# Patient Record
Sex: MALE | Race: BLACK OR AFRICAN AMERICAN | Employment: OTHER | URBAN - METROPOLITAN AREA
[De-identification: names, ages, dates, MRNs, and addresses within clinical notes are randomized per-mention and may not be internally consistent; named-entity substitution may affect disease eponyms.]

---

## 2018-09-07 LAB — PROSTATE SPECIFIC ANTIGEN: 0.29 NG/ML (ref 0–4)

## 2019-10-28 ENCOUNTER — APPOINTMENT (OUTPATIENT)
Dept: CT IMAGING | Age: 84
DRG: 378 | End: 2019-10-28
Payer: MEDICARE

## 2019-10-28 ENCOUNTER — APPOINTMENT (OUTPATIENT)
Dept: GENERAL RADIOLOGY | Age: 84
DRG: 378 | End: 2019-10-28
Payer: MEDICARE

## 2019-10-28 ENCOUNTER — HOSPITAL ENCOUNTER (INPATIENT)
Age: 84
LOS: 6 days | Discharge: HOME HEALTH | DRG: 378 | End: 2019-11-03
Attending: EMERGENCY MEDICINE | Admitting: INTERNAL MEDICINE
Payer: MEDICARE

## 2019-10-28 DIAGNOSIS — E27.8 ADRENAL NODULE (HCC): ICD-10-CM

## 2019-10-28 DIAGNOSIS — I48.91 ATRIAL FIBRILLATION WITH SLOW VENTRICULAR RESPONSE (HCC): ICD-10-CM

## 2019-10-28 DIAGNOSIS — I71.40 ABDOMINAL AORTIC ANEURYSM (AAA) WITHOUT RUPTURE: ICD-10-CM

## 2019-10-28 DIAGNOSIS — K57.92 DIVERTICULITIS: ICD-10-CM

## 2019-10-28 DIAGNOSIS — K62.5 RECTAL BLEEDING: Primary | ICD-10-CM

## 2019-10-28 PROBLEM — I10 HTN (HYPERTENSION): Status: ACTIVE | Noted: 2019-10-28

## 2019-10-28 PROBLEM — G47.33 OSA ON CPAP: Status: ACTIVE | Noted: 2019-10-28

## 2019-10-28 PROBLEM — N17.9 AKI (ACUTE KIDNEY INJURY) (HCC): Status: ACTIVE | Noted: 2019-10-28

## 2019-10-28 PROBLEM — D62 ACUTE BLOOD LOSS ANEMIA: Status: ACTIVE | Noted: 2019-10-28

## 2019-10-28 PROBLEM — K21.9 GERD (GASTROESOPHAGEAL REFLUX DISEASE): Status: ACTIVE | Noted: 2019-10-28

## 2019-10-28 PROBLEM — I73.9 PVD (PERIPHERAL VASCULAR DISEASE) (HCC): Status: ACTIVE | Noted: 2019-10-28

## 2019-10-28 PROBLEM — Z99.89 OSA ON CPAP: Status: ACTIVE | Noted: 2019-10-28

## 2019-10-28 PROBLEM — E66.9 OBESITY (BMI 30-39.9): Status: ACTIVE | Noted: 2019-10-28

## 2019-10-28 PROBLEM — K57.12 DIVERTICULITIS OF ILEUM: Status: ACTIVE | Noted: 2019-10-28

## 2019-10-28 PROBLEM — R55 SYNCOPE AND COLLAPSE: Status: ACTIVE | Noted: 2019-10-28

## 2019-10-28 PROBLEM — R00.1 BRADYCARDIA: Status: ACTIVE | Noted: 2019-10-28

## 2019-10-28 PROBLEM — N40.0 BPH (BENIGN PROSTATIC HYPERPLASIA): Status: ACTIVE | Noted: 2019-10-28

## 2019-10-28 PROBLEM — K57.10: Status: ACTIVE | Noted: 2019-10-28

## 2019-10-28 LAB
A/G RATIO: 1.2 (ref 1.1–2.2)
ABO/RH: NORMAL
ALBUMIN SERPL-MCNC: 3.7 G/DL (ref 3.4–5)
ALP BLD-CCNC: 43 U/L (ref 40–129)
ALT SERPL-CCNC: 13 U/L (ref 10–40)
ANION GAP SERPL CALCULATED.3IONS-SCNC: 12 MMOL/L (ref 3–16)
ANTIBODY SCREEN: NORMAL
APTT: 33.1 SEC (ref 26–36)
AST SERPL-CCNC: 16 U/L (ref 15–37)
BASOPHILS ABSOLUTE: 0 K/UL (ref 0–0.2)
BASOPHILS RELATIVE PERCENT: 0.6 %
BILIRUB SERPL-MCNC: 0.8 MG/DL (ref 0–1)
BUN BLDV-MCNC: 29 MG/DL (ref 7–20)
CALCIUM SERPL-MCNC: 9.5 MG/DL (ref 8.3–10.6)
CHLORIDE BLD-SCNC: 109 MMOL/L (ref 99–110)
CO2: 20 MMOL/L (ref 21–32)
CREAT SERPL-MCNC: 1.4 MG/DL (ref 0.8–1.3)
EKG ATRIAL RATE: 84 BPM
EKG ATRIAL RATE: 88 BPM
EKG DIAGNOSIS: NORMAL
EKG DIAGNOSIS: NORMAL
EKG Q-T INTERVAL: 490 MS
EKG Q-T INTERVAL: 554 MS
EKG QRS DURATION: 136 MS
EKG QRS DURATION: 136 MS
EKG QTC CALCULATION (BAZETT): 423 MS
EKG QTC CALCULATION (BAZETT): 434 MS
EKG R AXIS: -44 DEGREES
EKG R AXIS: -47 DEGREES
EKG T AXIS: 101 DEGREES
EKG T AXIS: 73 DEGREES
EKG VENTRICULAR RATE: 37 BPM
EKG VENTRICULAR RATE: 45 BPM
EOSINOPHILS ABSOLUTE: 0.1 K/UL (ref 0–0.6)
EOSINOPHILS RELATIVE PERCENT: 0.9 %
GFR AFRICAN AMERICAN: 58
GFR NON-AFRICAN AMERICAN: 48
GLOBULIN: 3.1 G/DL
GLUCOSE BLD-MCNC: 157 MG/DL (ref 70–99)
GLUCOSE BLD-MCNC: 97 MG/DL (ref 70–99)
HCT VFR BLD CALC: 29.7 % (ref 40.5–52.5)
HCT VFR BLD CALC: 35.4 % (ref 40.5–52.5)
HEMOGLOBIN: 11.2 G/DL (ref 13.5–17.5)
HEMOGLOBIN: 9.4 G/DL (ref 13.5–17.5)
INR BLD: 1.85 (ref 0.86–1.14)
LYMPHOCYTES ABSOLUTE: 0.6 K/UL (ref 1–5.1)
LYMPHOCYTES RELATIVE PERCENT: 9.2 %
MCH RBC QN AUTO: 27.7 PG (ref 26–34)
MCHC RBC AUTO-ENTMCNC: 31.7 G/DL (ref 31–36)
MCV RBC AUTO: 87.4 FL (ref 80–100)
MONOCYTES ABSOLUTE: 0.4 K/UL (ref 0–1.3)
MONOCYTES RELATIVE PERCENT: 5.3 %
NEUTROPHILS ABSOLUTE: 5.8 K/UL (ref 1.7–7.7)
NEUTROPHILS RELATIVE PERCENT: 84 %
PDW BLD-RTO: 17.2 % (ref 12.4–15.4)
PERFORMED ON: NORMAL
PLATELET # BLD: 150 K/UL (ref 135–450)
PMV BLD AUTO: 9.2 FL (ref 5–10.5)
POTASSIUM SERPL-SCNC: 3.9 MMOL/L (ref 3.5–5.1)
PRO-BNP: 780 PG/ML (ref 0–449)
PROTHROMBIN TIME: 21.1 SEC (ref 9.8–13)
RBC # BLD: 4.05 M/UL (ref 4.2–5.9)
SODIUM BLD-SCNC: 141 MMOL/L (ref 136–145)
TOTAL PROTEIN: 6.8 G/DL (ref 6.4–8.2)
TROPONIN: <0.01 NG/ML
WBC # BLD: 6.9 K/UL (ref 4–11)

## 2019-10-28 PROCEDURE — 83880 ASSAY OF NATRIURETIC PEPTIDE: CPT

## 2019-10-28 PROCEDURE — 2500000003 HC RX 250 WO HCPCS: Performed by: PHYSICIAN ASSISTANT

## 2019-10-28 PROCEDURE — 96361 HYDRATE IV INFUSION ADD-ON: CPT

## 2019-10-28 PROCEDURE — 96367 TX/PROPH/DG ADDL SEQ IV INF: CPT

## 2019-10-28 PROCEDURE — 2060000000 HC ICU INTERMEDIATE R&B

## 2019-10-28 PROCEDURE — 80053 COMPREHEN METABOLIC PANEL: CPT

## 2019-10-28 PROCEDURE — 93010 ELECTROCARDIOGRAM REPORT: CPT | Performed by: INTERNAL MEDICINE

## 2019-10-28 PROCEDURE — 86901 BLOOD TYPING SEROLOGIC RH(D): CPT

## 2019-10-28 PROCEDURE — 99291 CRITICAL CARE FIRST HOUR: CPT

## 2019-10-28 PROCEDURE — 2500000003 HC RX 250 WO HCPCS: Performed by: INTERNAL MEDICINE

## 2019-10-28 PROCEDURE — 6360000002 HC RX W HCPCS: Performed by: PHYSICIAN ASSISTANT

## 2019-10-28 PROCEDURE — 36415 COLL VENOUS BLD VENIPUNCTURE: CPT

## 2019-10-28 PROCEDURE — 85610 PROTHROMBIN TIME: CPT

## 2019-10-28 PROCEDURE — 85730 THROMBOPLASTIN TIME PARTIAL: CPT

## 2019-10-28 PROCEDURE — 86900 BLOOD TYPING SEROLOGIC ABO: CPT

## 2019-10-28 PROCEDURE — 2580000003 HC RX 258: Performed by: PHYSICIAN ASSISTANT

## 2019-10-28 PROCEDURE — 96365 THER/PROPH/DIAG IV INF INIT: CPT

## 2019-10-28 PROCEDURE — 93005 ELECTROCARDIOGRAM TRACING: CPT | Performed by: EMERGENCY MEDICINE

## 2019-10-28 PROCEDURE — 85025 COMPLETE CBC W/AUTO DIFF WBC: CPT

## 2019-10-28 PROCEDURE — 74177 CT ABD & PELVIS W/CONTRAST: CPT

## 2019-10-28 PROCEDURE — 84540 ASSAY OF URINE/UREA-N: CPT

## 2019-10-28 PROCEDURE — 85018 HEMOGLOBIN: CPT

## 2019-10-28 PROCEDURE — 85014 HEMATOCRIT: CPT

## 2019-10-28 PROCEDURE — 86923 COMPATIBILITY TEST ELECTRIC: CPT

## 2019-10-28 PROCEDURE — 2580000003 HC RX 258: Performed by: INTERNAL MEDICINE

## 2019-10-28 PROCEDURE — 86850 RBC ANTIBODY SCREEN: CPT

## 2019-10-28 PROCEDURE — 6370000000 HC RX 637 (ALT 250 FOR IP): Performed by: INTERNAL MEDICINE

## 2019-10-28 PROCEDURE — 71045 X-RAY EXAM CHEST 1 VIEW: CPT

## 2019-10-28 PROCEDURE — P9016 RBC LEUKOCYTES REDUCED: HCPCS

## 2019-10-28 PROCEDURE — 93005 ELECTROCARDIOGRAM TRACING: CPT

## 2019-10-28 PROCEDURE — 6360000002 HC RX W HCPCS: Performed by: INTERNAL MEDICINE

## 2019-10-28 PROCEDURE — 84484 ASSAY OF TROPONIN QUANT: CPT

## 2019-10-28 PROCEDURE — 6360000004 HC RX CONTRAST MEDICATION: Performed by: EMERGENCY MEDICINE

## 2019-10-28 RX ORDER — ALBUTEROL SULFATE 90 UG/1
2 AEROSOL, METERED RESPIRATORY (INHALATION) EVERY 4 HOURS PRN
Status: DISCONTINUED | OUTPATIENT
Start: 2019-10-28 | End: 2019-11-03 | Stop reason: HOSPADM

## 2019-10-28 RX ORDER — AMIODARONE HYDROCHLORIDE 200 MG/1
200 TABLET ORAL DAILY
Refills: 3 | Status: ON HOLD | COMMUNITY
Start: 2019-08-16 | End: 2019-11-03 | Stop reason: HOSPADM

## 2019-10-28 RX ORDER — MEMANTINE HYDROCHLORIDE 10 MG/1
10 TABLET ORAL 2 TIMES DAILY
COMMUNITY
Start: 2019-10-04

## 2019-10-28 RX ORDER — LANOLIN ALCOHOL/MO/W.PET/CERES
1000 CREAM (GRAM) TOPICAL DAILY
COMMUNITY
Start: 2019-07-16

## 2019-10-28 RX ORDER — 0.9 % SODIUM CHLORIDE 0.9 %
500 INTRAVENOUS SOLUTION INTRAVENOUS ONCE
Status: COMPLETED | OUTPATIENT
Start: 2019-10-28 | End: 2019-10-28

## 2019-10-28 RX ORDER — FUROSEMIDE 40 MG/1
40 TABLET ORAL DAILY
Status: ON HOLD | COMMUNITY
Start: 2019-08-14 | End: 2019-11-03 | Stop reason: SDUPTHER

## 2019-10-28 RX ORDER — CIPROFLOXACIN 2 MG/ML
400 INJECTION, SOLUTION INTRAVENOUS ONCE
Status: COMPLETED | OUTPATIENT
Start: 2019-10-28 | End: 2019-10-28

## 2019-10-28 RX ORDER — POTASSIUM CHLORIDE 20 MEQ/1
20 TABLET, EXTENDED RELEASE ORAL 2 TIMES DAILY
COMMUNITY
Start: 2019-05-03

## 2019-10-28 RX ORDER — SODIUM CHLORIDE 9 MG/ML
INJECTION, SOLUTION INTRAVENOUS CONTINUOUS
Status: DISCONTINUED | OUTPATIENT
Start: 2019-10-28 | End: 2019-11-01

## 2019-10-28 RX ORDER — SODIUM CHLORIDE 0.9 % (FLUSH) 0.9 %
10 SYRINGE (ML) INJECTION EVERY 12 HOURS SCHEDULED
Status: DISCONTINUED | OUTPATIENT
Start: 2019-10-28 | End: 2019-11-03 | Stop reason: HOSPADM

## 2019-10-28 RX ORDER — DONEPEZIL HYDROCHLORIDE 10 MG/1
10 TABLET, FILM COATED ORAL NIGHTLY
COMMUNITY
Start: 2019-08-06

## 2019-10-28 RX ORDER — ACETAMINOPHEN 325 MG/1
650 TABLET ORAL EVERY 4 HOURS PRN
Status: DISCONTINUED | OUTPATIENT
Start: 2019-10-28 | End: 2019-11-03 | Stop reason: HOSPADM

## 2019-10-28 RX ORDER — ATORVASTATIN CALCIUM 40 MG/1
40 TABLET, FILM COATED ORAL DAILY
Refills: 0 | COMMUNITY
Start: 2019-09-16

## 2019-10-28 RX ORDER — ONDANSETRON 2 MG/ML
4 INJECTION INTRAMUSCULAR; INTRAVENOUS EVERY 6 HOURS PRN
Status: DISCONTINUED | OUTPATIENT
Start: 2019-10-28 | End: 2019-11-03 | Stop reason: HOSPADM

## 2019-10-28 RX ORDER — SODIUM CHLORIDE 0.9 % (FLUSH) 0.9 %
10 SYRINGE (ML) INJECTION PRN
Status: DISCONTINUED | OUTPATIENT
Start: 2019-10-28 | End: 2019-11-03 | Stop reason: HOSPADM

## 2019-10-28 RX ADMIN — Medication 10 ML: at 21:38

## 2019-10-28 RX ADMIN — POLYETHYLENE GLYCOL 3350, SODIUM SULFATE ANHYDROUS, SODIUM BICARBONATE, SODIUM CHLORIDE, POTASSIUM CHLORIDE 4000 ML: 236; 22.74; 6.74; 5.86; 2.97 POWDER, FOR SOLUTION ORAL at 21:35

## 2019-10-28 RX ADMIN — CIPROFLOXACIN 400 MG: 2 INJECTION, SOLUTION INTRAVENOUS at 12:10

## 2019-10-28 RX ADMIN — METRONIDAZOLE 500 MG: 500 INJECTION, SOLUTION INTRAVENOUS at 10:39

## 2019-10-28 RX ADMIN — SODIUM CHLORIDE 500 ML: 9 INJECTION, SOLUTION INTRAVENOUS at 09:07

## 2019-10-28 RX ADMIN — IOPAMIDOL 75 ML: 755 INJECTION, SOLUTION INTRAVENOUS at 09:29

## 2019-10-28 RX ADMIN — TAZOBACTAM SODIUM AND PIPERACILLIN SODIUM 3.38 G: 375; 3 INJECTION, SOLUTION INTRAVENOUS at 18:06

## 2019-10-28 RX ADMIN — SODIUM CHLORIDE: 9 INJECTION, SOLUTION INTRAVENOUS at 18:06

## 2019-10-28 RX ADMIN — ONDANSETRON 4 MG: 2 INJECTION INTRAMUSCULAR; INTRAVENOUS at 21:37

## 2019-10-28 ASSESSMENT — PAIN SCALES - GENERAL
PAINLEVEL_OUTOF10: 0

## 2019-10-28 ASSESSMENT — ENCOUNTER SYMPTOMS
SHORTNESS OF BREATH: 1
CONSTIPATION: 0
WHEEZING: 0
NAUSEA: 0
ABDOMINAL DISTENTION: 0
RECTAL PAIN: 0
COUGH: 0
STRIDOR: 0
ABDOMINAL PAIN: 0
BLOOD IN STOOL: 1
COLOR CHANGE: 0
BACK PAIN: 0
VOMITING: 0
DIARRHEA: 1

## 2019-10-28 ASSESSMENT — PAIN DESCRIPTION - INTENSITY: RATING_2: 0

## 2019-10-29 ENCOUNTER — ANESTHESIA EVENT (OUTPATIENT)
Dept: ENDOSCOPY | Age: 84
DRG: 378 | End: 2019-10-29
Payer: MEDICARE

## 2019-10-29 ENCOUNTER — APPOINTMENT (OUTPATIENT)
Dept: NUCLEAR MEDICINE | Age: 84
DRG: 378 | End: 2019-10-29
Payer: MEDICARE

## 2019-10-29 ENCOUNTER — ANESTHESIA (OUTPATIENT)
Dept: ENDOSCOPY | Age: 84
DRG: 378 | End: 2019-10-29
Payer: MEDICARE

## 2019-10-29 ENCOUNTER — APPOINTMENT (OUTPATIENT)
Dept: GENERAL RADIOLOGY | Age: 84
DRG: 378 | End: 2019-10-29
Payer: MEDICARE

## 2019-10-29 VITALS
SYSTOLIC BLOOD PRESSURE: 92 MMHG | DIASTOLIC BLOOD PRESSURE: 49 MMHG | RESPIRATION RATE: 18 BRPM | OXYGEN SATURATION: 100 %

## 2019-10-29 LAB
ANION GAP SERPL CALCULATED.3IONS-SCNC: 12 MMOL/L (ref 3–16)
BLOOD BANK DISPENSE STATUS: NORMAL
BLOOD BANK PRODUCT CODE: NORMAL
BPU ID: NORMAL
BUN BLDV-MCNC: 28 MG/DL (ref 7–20)
CALCIUM SERPL-MCNC: 8.1 MG/DL (ref 8.3–10.6)
CHLORIDE BLD-SCNC: 112 MMOL/L (ref 99–110)
CO2: 22 MMOL/L (ref 21–32)
CREAT SERPL-MCNC: 1.3 MG/DL (ref 0.8–1.3)
DESCRIPTION BLOOD BANK: NORMAL
GFR AFRICAN AMERICAN: >60
GFR NON-AFRICAN AMERICAN: 52
GLUCOSE BLD-MCNC: 140 MG/DL (ref 70–99)
GLUCOSE BLD-MCNC: 207 MG/DL (ref 70–99)
HCT VFR BLD CALC: 21.6 % (ref 40.5–52.5)
HCT VFR BLD CALC: 23.4 % (ref 40.5–52.5)
HCT VFR BLD CALC: 24.5 % (ref 40.5–52.5)
HCT VFR BLD CALC: 26.3 % (ref 40.5–52.5)
HEMOGLOBIN: 7 G/DL (ref 13.5–17.5)
HEMOGLOBIN: 7.5 G/DL (ref 13.5–17.5)
HEMOGLOBIN: 7.9 G/DL (ref 13.5–17.5)
HEMOGLOBIN: 8.6 G/DL (ref 13.5–17.5)
MAGNESIUM: 2 MG/DL (ref 1.8–2.4)
MCH RBC QN AUTO: 28.2 PG (ref 26–34)
MCHC RBC AUTO-ENTMCNC: 32.9 G/DL (ref 31–36)
MCV RBC AUTO: 85.8 FL (ref 80–100)
PDW BLD-RTO: 16.5 % (ref 12.4–15.4)
PERFORMED ON: ABNORMAL
PLATELET # BLD: 120 K/UL (ref 135–450)
PMV BLD AUTO: 9 FL (ref 5–10.5)
POTASSIUM SERPL-SCNC: 3 MMOL/L (ref 3.5–5.1)
POTASSIUM SERPL-SCNC: 3.5 MMOL/L (ref 3.5–5.1)
RBC # BLD: 3.06 M/UL (ref 4.2–5.9)
SODIUM BLD-SCNC: 146 MMOL/L (ref 136–145)
UREA NITROGEN, UR: 404.7 MG/DL (ref 800–1666)
WBC # BLD: 12.7 K/UL (ref 4–11)

## 2019-10-29 PROCEDURE — 2580000003 HC RX 258: Performed by: NURSE ANESTHETIST, CERTIFIED REGISTERED

## 2019-10-29 PROCEDURE — 85014 HEMATOCRIT: CPT

## 2019-10-29 PROCEDURE — 84132 ASSAY OF SERUM POTASSIUM: CPT

## 2019-10-29 PROCEDURE — 02HV33Z INSERTION OF INFUSION DEVICE INTO SUPERIOR VENA CAVA, PERCUTANEOUS APPROACH: ICD-10-PCS | Performed by: INTERNAL MEDICINE

## 2019-10-29 PROCEDURE — 3700000000 HC ANESTHESIA ATTENDED CARE: Performed by: INTERNAL MEDICINE

## 2019-10-29 PROCEDURE — 78278 ACUTE GI BLOOD LOSS IMAGING: CPT

## 2019-10-29 PROCEDURE — 2500000003 HC RX 250 WO HCPCS: Performed by: INTERNAL MEDICINE

## 2019-10-29 PROCEDURE — 71045 X-RAY EXAM CHEST 1 VIEW: CPT

## 2019-10-29 PROCEDURE — 3430000000 HC RX DIAGNOSTIC RADIOPHARMACEUTICAL: Performed by: INTERNAL MEDICINE

## 2019-10-29 PROCEDURE — 2580000003 HC RX 258: Performed by: INTERNAL MEDICINE

## 2019-10-29 PROCEDURE — 3700000001 HC ADD 15 MINUTES (ANESTHESIA): Performed by: INTERNAL MEDICINE

## 2019-10-29 PROCEDURE — P9016 RBC LEUKOCYTES REDUCED: HCPCS

## 2019-10-29 PROCEDURE — 2580000003 HC RX 258: Performed by: HOSPITALIST

## 2019-10-29 PROCEDURE — 36415 COLL VENOUS BLD VENIPUNCTURE: CPT

## 2019-10-29 PROCEDURE — 2060000000 HC ICU INTERMEDIATE R&B

## 2019-10-29 PROCEDURE — 7100000000 HC PACU RECOVERY - FIRST 15 MIN: Performed by: INTERNAL MEDICINE

## 2019-10-29 PROCEDURE — 2709999900 HC NON-CHARGEABLE SUPPLY: Performed by: INTERNAL MEDICINE

## 2019-10-29 PROCEDURE — 80048 BASIC METABOLIC PNL TOTAL CA: CPT

## 2019-10-29 PROCEDURE — 99223 1ST HOSP IP/OBS HIGH 75: CPT | Performed by: INTERNAL MEDICINE

## 2019-10-29 PROCEDURE — 85018 HEMOGLOBIN: CPT

## 2019-10-29 PROCEDURE — 36430 TRANSFUSION BLD/BLD COMPNT: CPT

## 2019-10-29 PROCEDURE — 3609027000 HC COLONOSCOPY: Performed by: INTERNAL MEDICINE

## 2019-10-29 PROCEDURE — 36556 INSERT NON-TUNNEL CV CATH: CPT

## 2019-10-29 PROCEDURE — 94760 N-INVAS EAR/PLS OXIMETRY 1: CPT

## 2019-10-29 PROCEDURE — 83735 ASSAY OF MAGNESIUM: CPT

## 2019-10-29 PROCEDURE — 36592 COLLECT BLOOD FROM PICC: CPT

## 2019-10-29 PROCEDURE — A9560 TC99M LABELED RBC: HCPCS | Performed by: INTERNAL MEDICINE

## 2019-10-29 PROCEDURE — 85027 COMPLETE CBC AUTOMATED: CPT

## 2019-10-29 PROCEDURE — 0DJD8ZZ INSPECTION OF LOWER INTESTINAL TRACT, VIA NATURAL OR ARTIFICIAL OPENING ENDOSCOPIC: ICD-10-PCS | Performed by: INTERNAL MEDICINE

## 2019-10-29 PROCEDURE — 2500000003 HC RX 250 WO HCPCS: Performed by: NURSE ANESTHETIST, CERTIFIED REGISTERED

## 2019-10-29 PROCEDURE — 2700000000 HC OXYGEN THERAPY PER DAY

## 2019-10-29 PROCEDURE — 6360000002 HC RX W HCPCS: Performed by: NURSE ANESTHETIST, CERTIFIED REGISTERED

## 2019-10-29 PROCEDURE — 80299 QUANTITATIVE ASSAY DRUG: CPT

## 2019-10-29 PROCEDURE — 7100000001 HC PACU RECOVERY - ADDTL 15 MIN: Performed by: INTERNAL MEDICINE

## 2019-10-29 RX ORDER — 0.9 % SODIUM CHLORIDE 0.9 %
250 INTRAVENOUS SOLUTION INTRAVENOUS ONCE
Status: COMPLETED | OUTPATIENT
Start: 2019-10-29 | End: 2019-10-29

## 2019-10-29 RX ORDER — PROPOFOL 10 MG/ML
INJECTION, EMULSION INTRAVENOUS PRN
Status: DISCONTINUED | OUTPATIENT
Start: 2019-10-29 | End: 2019-10-29 | Stop reason: SDUPTHER

## 2019-10-29 RX ORDER — EPHEDRINE SULFATE/0.9% NACL/PF 50 MG/5 ML
SYRINGE (ML) INTRAVENOUS PRN
Status: DISCONTINUED | OUTPATIENT
Start: 2019-10-29 | End: 2019-10-29 | Stop reason: SDUPTHER

## 2019-10-29 RX ORDER — 0.9 % SODIUM CHLORIDE 0.9 %
250 INTRAVENOUS SOLUTION INTRAVENOUS ONCE
Status: COMPLETED | OUTPATIENT
Start: 2019-10-29 | End: 2019-10-30

## 2019-10-29 RX ORDER — SODIUM CHLORIDE 9 MG/ML
INJECTION, SOLUTION INTRAVENOUS CONTINUOUS PRN
Status: DISCONTINUED | OUTPATIENT
Start: 2019-10-29 | End: 2019-10-29 | Stop reason: SDUPTHER

## 2019-10-29 RX ORDER — SODIUM CHLORIDE 0.9 % (FLUSH) 0.9 %
10 SYRINGE (ML) INJECTION PRN
Status: DISCONTINUED | OUTPATIENT
Start: 2019-10-29 | End: 2019-11-03 | Stop reason: HOSPADM

## 2019-10-29 RX ORDER — SODIUM CHLORIDE 9 MG/ML
INJECTION, SOLUTION INTRAVENOUS
Status: DISPENSED
Start: 2019-10-29 | End: 2019-10-30

## 2019-10-29 RX ORDER — SODIUM CHLORIDE 9 MG/ML
INJECTION, SOLUTION INTRAVENOUS
Status: COMPLETED
Start: 2019-10-29 | End: 2019-10-29

## 2019-10-29 RX ORDER — PROPOFOL 10 MG/ML
INJECTION, EMULSION INTRAVENOUS CONTINUOUS PRN
Status: DISCONTINUED | OUTPATIENT
Start: 2019-10-29 | End: 2019-10-29 | Stop reason: SDUPTHER

## 2019-10-29 RX ADMIN — Medication 10 ML: at 22:44

## 2019-10-29 RX ADMIN — PHENYLEPHRINE HYDROCHLORIDE 100 MCG: 10 INJECTION INTRAVENOUS at 11:36

## 2019-10-29 RX ADMIN — PHENYLEPHRINE HYDROCHLORIDE 100 MCG: 10 INJECTION INTRAVENOUS at 11:57

## 2019-10-29 RX ADMIN — Medication 10 ML: at 13:52

## 2019-10-29 RX ADMIN — SODIUM CHLORIDE 250 ML: 9 INJECTION, SOLUTION INTRAVENOUS at 22:45

## 2019-10-29 RX ADMIN — TAZOBACTAM SODIUM AND PIPERACILLIN SODIUM 3.38 G: 375; 3 INJECTION, SOLUTION INTRAVENOUS at 01:04

## 2019-10-29 RX ADMIN — Medication 10 MG: at 11:35

## 2019-10-29 RX ADMIN — Medication 24.9 MILLICURIE: at 13:51

## 2019-10-29 RX ADMIN — TAZOBACTAM SODIUM AND PIPERACILLIN SODIUM 3.38 G: 375; 3 INJECTION, SOLUTION INTRAVENOUS at 17:46

## 2019-10-29 RX ADMIN — SODIUM CHLORIDE: 9 INJECTION, SOLUTION INTRAVENOUS at 01:01

## 2019-10-29 RX ADMIN — PROPOFOL 50 MG: 10 INJECTION, EMULSION INTRAVENOUS at 11:27

## 2019-10-29 RX ADMIN — Medication 10 ML: at 08:40

## 2019-10-29 RX ADMIN — SODIUM CHLORIDE: 9 INJECTION, SOLUTION INTRAVENOUS at 11:20

## 2019-10-29 RX ADMIN — TAZOBACTAM SODIUM AND PIPERACILLIN SODIUM 3.38 G: 375; 3 INJECTION, SOLUTION INTRAVENOUS at 08:40

## 2019-10-29 RX ADMIN — PROPOFOL 120 MCG/KG/MIN: 10 INJECTION, EMULSION INTRAVENOUS at 11:29

## 2019-10-29 RX ADMIN — SODIUM CHLORIDE: 9 INJECTION, SOLUTION INTRAVENOUS at 17:49

## 2019-10-29 RX ADMIN — SODIUM CHLORIDE 250 ML: 9 INJECTION, SOLUTION INTRAVENOUS at 02:11

## 2019-10-29 ASSESSMENT — PAIN SCALES - GENERAL
PAINLEVEL_OUTOF10: 0

## 2019-10-30 ENCOUNTER — APPOINTMENT (OUTPATIENT)
Dept: ULTRASOUND IMAGING | Age: 84
DRG: 378 | End: 2019-10-30
Payer: MEDICARE

## 2019-10-30 LAB
ANION GAP SERPL CALCULATED.3IONS-SCNC: 8 MMOL/L (ref 3–16)
BASOPHILS ABSOLUTE: 0 K/UL (ref 0–0.2)
BASOPHILS RELATIVE PERCENT: 0.2 %
BUN BLDV-MCNC: 29 MG/DL (ref 7–20)
CALCIUM SERPL-MCNC: 8.2 MG/DL (ref 8.3–10.6)
CHLORIDE BLD-SCNC: 112 MMOL/L (ref 99–110)
CO2: 24 MMOL/L (ref 21–32)
CREAT SERPL-MCNC: 1.5 MG/DL (ref 0.8–1.3)
EOSINOPHILS ABSOLUTE: 0 K/UL (ref 0–0.6)
EOSINOPHILS RELATIVE PERCENT: 0 %
GFR AFRICAN AMERICAN: 54
GFR NON-AFRICAN AMERICAN: 44
GLUCOSE BLD-MCNC: 112 MG/DL (ref 70–99)
HCT VFR BLD CALC: 20.4 % (ref 40.5–52.5)
HCT VFR BLD CALC: 21.9 % (ref 40.5–52.5)
HCT VFR BLD CALC: 22 % (ref 40.5–52.5)
HCT VFR BLD CALC: 22.5 % (ref 40.5–52.5)
HEMOGLOBIN: 6.7 G/DL (ref 13.5–17.5)
HEMOGLOBIN: 7 G/DL (ref 13.5–17.5)
HEMOGLOBIN: 7.3 G/DL (ref 13.5–17.5)
HEMOGLOBIN: 7.4 G/DL (ref 13.5–17.5)
LYMPHOCYTES ABSOLUTE: 1.4 K/UL (ref 1–5.1)
LYMPHOCYTES RELATIVE PERCENT: 11.3 %
MAGNESIUM: 2.1 MG/DL (ref 1.8–2.4)
MCH RBC QN AUTO: 28.5 PG (ref 26–34)
MCHC RBC AUTO-ENTMCNC: 33 G/DL (ref 31–36)
MCV RBC AUTO: 86.2 FL (ref 80–100)
MONOCYTES ABSOLUTE: 1 K/UL (ref 0–1.3)
MONOCYTES RELATIVE PERCENT: 8.3 %
NEUTROPHILS ABSOLUTE: 9.9 K/UL (ref 1.7–7.7)
NEUTROPHILS RELATIVE PERCENT: 80.2 %
PDW BLD-RTO: 16.1 % (ref 12.4–15.4)
PLATELET # BLD: 107 K/UL (ref 135–450)
PMV BLD AUTO: 9.4 FL (ref 5–10.5)
POTASSIUM REFLEX MAGNESIUM: 3.1 MMOL/L (ref 3.5–5.1)
POTASSIUM SERPL-SCNC: 3.3 MMOL/L (ref 3.5–5.1)
POTASSIUM SERPL-SCNC: 3.5 MMOL/L (ref 3.5–5.1)
POTASSIUM SERPL-SCNC: 3.6 MMOL/L (ref 3.5–5.1)
RBC # BLD: 2.55 M/UL (ref 4.2–5.9)
SODIUM BLD-SCNC: 144 MMOL/L (ref 136–145)
WBC # BLD: 12.3 K/UL (ref 4–11)

## 2019-10-30 PROCEDURE — 85018 HEMOGLOBIN: CPT

## 2019-10-30 PROCEDURE — 6360000002 HC RX W HCPCS: Performed by: HOSPITALIST

## 2019-10-30 PROCEDURE — 2580000003 HC RX 258: Performed by: INTERNAL MEDICINE

## 2019-10-30 PROCEDURE — 85014 HEMATOCRIT: CPT

## 2019-10-30 PROCEDURE — 85025 COMPLETE CBC W/AUTO DIFF WBC: CPT

## 2019-10-30 PROCEDURE — 76770 US EXAM ABDO BACK WALL COMP: CPT

## 2019-10-30 PROCEDURE — 2500000003 HC RX 250 WO HCPCS: Performed by: INTERNAL MEDICINE

## 2019-10-30 PROCEDURE — 94760 N-INVAS EAR/PLS OXIMETRY 1: CPT

## 2019-10-30 PROCEDURE — 2060000000 HC ICU INTERMEDIATE R&B

## 2019-10-30 PROCEDURE — P9016 RBC LEUKOCYTES REDUCED: HCPCS

## 2019-10-30 PROCEDURE — 84132 ASSAY OF SERUM POTASSIUM: CPT

## 2019-10-30 PROCEDURE — 6360000002 HC RX W HCPCS: Performed by: INTERNAL MEDICINE

## 2019-10-30 PROCEDURE — 97165 OT EVAL LOW COMPLEX 30 MIN: CPT

## 2019-10-30 PROCEDURE — 36430 TRANSFUSION BLD/BLD COMPNT: CPT

## 2019-10-30 PROCEDURE — 80048 BASIC METABOLIC PNL TOTAL CA: CPT

## 2019-10-30 PROCEDURE — 97161 PT EVAL LOW COMPLEX 20 MIN: CPT

## 2019-10-30 PROCEDURE — 83735 ASSAY OF MAGNESIUM: CPT

## 2019-10-30 PROCEDURE — 97530 THERAPEUTIC ACTIVITIES: CPT

## 2019-10-30 PROCEDURE — 97535 SELF CARE MNGMENT TRAINING: CPT

## 2019-10-30 RX ORDER — 0.9 % SODIUM CHLORIDE 0.9 %
250 INTRAVENOUS SOLUTION INTRAVENOUS ONCE
Status: COMPLETED | OUTPATIENT
Start: 2019-10-30 | End: 2019-10-31

## 2019-10-30 RX ORDER — POTASSIUM CHLORIDE 29.8 MG/ML
20 INJECTION INTRAVENOUS PRN
Status: DISCONTINUED | OUTPATIENT
Start: 2019-10-30 | End: 2019-11-03 | Stop reason: HOSPADM

## 2019-10-30 RX ORDER — POTASSIUM CHLORIDE 29.8 MG/ML
20 INJECTION INTRAVENOUS ONCE
Status: COMPLETED | OUTPATIENT
Start: 2019-10-30 | End: 2019-10-30

## 2019-10-30 RX ADMIN — POTASSIUM CHLORIDE 20 MEQ: 29.8 INJECTION, SOLUTION INTRAVENOUS at 18:35

## 2019-10-30 RX ADMIN — POTASSIUM CHLORIDE 20 MEQ: 29.8 INJECTION, SOLUTION INTRAVENOUS at 17:26

## 2019-10-30 RX ADMIN — Medication 10 ML: at 05:47

## 2019-10-30 RX ADMIN — POTASSIUM CHLORIDE 20 MEQ: 29.8 INJECTION, SOLUTION INTRAVENOUS at 06:55

## 2019-10-30 RX ADMIN — TAZOBACTAM SODIUM AND PIPERACILLIN SODIUM 3.38 G: 375; 3 INJECTION, SOLUTION INTRAVENOUS at 08:38

## 2019-10-30 RX ADMIN — POTASSIUM CHLORIDE 20 MEQ: 29.8 INJECTION, SOLUTION INTRAVENOUS at 13:34

## 2019-10-30 RX ADMIN — TAZOBACTAM SODIUM AND PIPERACILLIN SODIUM 3.38 G: 375; 3 INJECTION, SOLUTION INTRAVENOUS at 01:07

## 2019-10-30 RX ADMIN — POTASSIUM CHLORIDE 20 MEQ: 29.8 INJECTION, SOLUTION INTRAVENOUS at 10:41

## 2019-10-30 RX ADMIN — POTASSIUM CHLORIDE 20 MEQ: 29.8 INJECTION, SOLUTION INTRAVENOUS at 05:44

## 2019-10-30 RX ADMIN — TAZOBACTAM SODIUM AND PIPERACILLIN SODIUM 3.38 G: 375; 3 INJECTION, SOLUTION INTRAVENOUS at 16:35

## 2019-10-30 RX ADMIN — Medication 10 ML: at 21:30

## 2019-10-30 RX ADMIN — Medication 10 ML: at 08:38

## 2019-10-30 ASSESSMENT — PAIN SCALES - GENERAL
PAINLEVEL_OUTOF10: 0

## 2019-10-31 ENCOUNTER — ANESTHESIA EVENT (OUTPATIENT)
Dept: ENDOSCOPY | Age: 84
DRG: 378 | End: 2019-10-31
Payer: MEDICARE

## 2019-10-31 ENCOUNTER — ANESTHESIA (OUTPATIENT)
Dept: ENDOSCOPY | Age: 84
DRG: 378 | End: 2019-10-31
Payer: MEDICARE

## 2019-10-31 VITALS — DIASTOLIC BLOOD PRESSURE: 66 MMHG | SYSTOLIC BLOOD PRESSURE: 138 MMHG | OXYGEN SATURATION: 100 %

## 2019-10-31 LAB
ANION GAP SERPL CALCULATED.3IONS-SCNC: 6 MMOL/L (ref 3–16)
BLOOD BANK DISPENSE STATUS: NORMAL
BLOOD BANK PRODUCT CODE: NORMAL
BPU ID: NORMAL
BUN BLDV-MCNC: 17 MG/DL (ref 7–20)
CALCIUM SERPL-MCNC: 8.2 MG/DL (ref 8.3–10.6)
CHLORIDE BLD-SCNC: 115 MMOL/L (ref 99–110)
CO2: 23 MMOL/L (ref 21–32)
CREAT SERPL-MCNC: 1.1 MG/DL (ref 0.8–1.3)
DESCRIPTION BLOOD BANK: NORMAL
GFR AFRICAN AMERICAN: >60
GFR NON-AFRICAN AMERICAN: >60
GLUCOSE BLD-MCNC: 101 MG/DL (ref 70–99)
HCT VFR BLD CALC: 21.1 % (ref 40.5–52.5)
HCT VFR BLD CALC: 21.6 % (ref 40.5–52.5)
HCT VFR BLD CALC: 23.6 % (ref 40.5–52.5)
HEMOGLOBIN: 7 G/DL (ref 13.5–17.5)
HEMOGLOBIN: 7.2 G/DL (ref 13.5–17.5)
HEMOGLOBIN: 7.7 G/DL (ref 13.5–17.5)
INR BLD: 1.13 (ref 0.86–1.14)
MAGNESIUM: 2.2 MG/DL (ref 1.8–2.4)
MCH RBC QN AUTO: 28.9 PG (ref 26–34)
MCHC RBC AUTO-ENTMCNC: 33.4 G/DL (ref 31–36)
MCV RBC AUTO: 86.5 FL (ref 80–100)
PDW BLD-RTO: 16.4 % (ref 12.4–15.4)
PLATELET # BLD: 97 K/UL (ref 135–450)
PLATELET SLIDE REVIEW: ABNORMAL
PMV BLD AUTO: 9.1 FL (ref 5–10.5)
POTASSIUM SERPL-SCNC: 3.3 MMOL/L (ref 3.5–5.1)
POTASSIUM SERPL-SCNC: 4 MMOL/L (ref 3.5–5.1)
PROTHROMBIN TIME: 12.9 SEC (ref 9.8–13)
RBC # BLD: 2.44 M/UL (ref 4.2–5.9)
SODIUM BLD-SCNC: 144 MMOL/L (ref 136–145)
WBC # BLD: 9.1 K/UL (ref 4–11)

## 2019-10-31 PROCEDURE — 3700000001 HC ADD 15 MINUTES (ANESTHESIA): Performed by: INTERNAL MEDICINE

## 2019-10-31 PROCEDURE — 2500000003 HC RX 250 WO HCPCS: Performed by: INTERNAL MEDICINE

## 2019-10-31 PROCEDURE — 2580000003 HC RX 258: Performed by: INTERNAL MEDICINE

## 2019-10-31 PROCEDURE — 2700000000 HC OXYGEN THERAPY PER DAY

## 2019-10-31 PROCEDURE — 80048 BASIC METABOLIC PNL TOTAL CA: CPT

## 2019-10-31 PROCEDURE — 85018 HEMOGLOBIN: CPT

## 2019-10-31 PROCEDURE — 3609010600 HC COLONOSCOPY POLYPECTOMY SNARE/COLD BIOPSY: Performed by: INTERNAL MEDICINE

## 2019-10-31 PROCEDURE — 2720000010 HC SURG SUPPLY STERILE: Performed by: INTERNAL MEDICINE

## 2019-10-31 PROCEDURE — 85027 COMPLETE CBC AUTOMATED: CPT

## 2019-10-31 PROCEDURE — 6360000002 HC RX W HCPCS: Performed by: HOSPITALIST

## 2019-10-31 PROCEDURE — 85610 PROTHROMBIN TIME: CPT

## 2019-10-31 PROCEDURE — 2580000003 HC RX 258: Performed by: NURSE ANESTHETIST, CERTIFIED REGISTERED

## 2019-10-31 PROCEDURE — 88305 TISSUE EXAM BY PATHOLOGIST: CPT

## 2019-10-31 PROCEDURE — 6370000000 HC RX 637 (ALT 250 FOR IP): Performed by: PHYSICIAN ASSISTANT

## 2019-10-31 PROCEDURE — 2060000000 HC ICU INTERMEDIATE R&B

## 2019-10-31 PROCEDURE — 6360000002 HC RX W HCPCS: Performed by: INTERNAL MEDICINE

## 2019-10-31 PROCEDURE — 2709999900 HC NON-CHARGEABLE SUPPLY: Performed by: INTERNAL MEDICINE

## 2019-10-31 PROCEDURE — 94761 N-INVAS EAR/PLS OXIMETRY MLT: CPT

## 2019-10-31 PROCEDURE — 6370000000 HC RX 637 (ALT 250 FOR IP): Performed by: INTERNAL MEDICINE

## 2019-10-31 PROCEDURE — 84132 ASSAY OF SERUM POTASSIUM: CPT

## 2019-10-31 PROCEDURE — 0DJ08ZZ INSPECTION OF UPPER INTESTINAL TRACT, VIA NATURAL OR ARTIFICIAL OPENING ENDOSCOPIC: ICD-10-PCS | Performed by: INTERNAL MEDICINE

## 2019-10-31 PROCEDURE — 3700000000 HC ANESTHESIA ATTENDED CARE: Performed by: INTERNAL MEDICINE

## 2019-10-31 PROCEDURE — 3609017100 HC EGD: Performed by: INTERNAL MEDICINE

## 2019-10-31 PROCEDURE — 83735 ASSAY OF MAGNESIUM: CPT

## 2019-10-31 PROCEDURE — 6360000002 HC RX W HCPCS: Performed by: NURSE ANESTHETIST, CERTIFIED REGISTERED

## 2019-10-31 PROCEDURE — 85014 HEMATOCRIT: CPT

## 2019-10-31 PROCEDURE — 2580000003 HC RX 258: Performed by: HOSPITALIST

## 2019-10-31 PROCEDURE — 7100000001 HC PACU RECOVERY - ADDTL 15 MIN: Performed by: INTERNAL MEDICINE

## 2019-10-31 PROCEDURE — 7100000000 HC PACU RECOVERY - FIRST 15 MIN: Performed by: INTERNAL MEDICINE

## 2019-10-31 PROCEDURE — 0DBK8ZX EXCISION OF ASCENDING COLON, VIA NATURAL OR ARTIFICIAL OPENING ENDOSCOPIC, DIAGNOSTIC: ICD-10-PCS | Performed by: INTERNAL MEDICINE

## 2019-10-31 RX ORDER — PROPOFOL 10 MG/ML
INJECTION, EMULSION INTRAVENOUS PRN
Status: DISCONTINUED | OUTPATIENT
Start: 2019-10-31 | End: 2019-10-31 | Stop reason: SDUPTHER

## 2019-10-31 RX ORDER — LANOLIN ALCOHOL/MO/W.PET/CERES
1000 CREAM (GRAM) TOPICAL DAILY
Status: DISCONTINUED | OUTPATIENT
Start: 2019-11-01 | End: 2019-11-03 | Stop reason: HOSPADM

## 2019-10-31 RX ORDER — AMIODARONE HYDROCHLORIDE 200 MG/1
200 TABLET ORAL DAILY
Status: DISCONTINUED | OUTPATIENT
Start: 2019-10-31 | End: 2019-10-31

## 2019-10-31 RX ORDER — SODIUM CHLORIDE 9 MG/ML
INJECTION, SOLUTION INTRAVENOUS CONTINUOUS PRN
Status: DISCONTINUED | OUTPATIENT
Start: 2019-10-31 | End: 2019-10-31 | Stop reason: SDUPTHER

## 2019-10-31 RX ORDER — SODIUM CHLORIDE 9 MG/ML
INJECTION, SOLUTION INTRAVENOUS
Status: DISPENSED
Start: 2019-10-31 | End: 2019-10-31

## 2019-10-31 RX ORDER — DONEPEZIL HYDROCHLORIDE 5 MG/1
10 TABLET, FILM COATED ORAL NIGHTLY
Status: DISCONTINUED | OUTPATIENT
Start: 2019-10-31 | End: 2019-11-03 | Stop reason: HOSPADM

## 2019-10-31 RX ORDER — PEG-3350, SODIUM SULFATE, SODIUM CHLORIDE, POTASSIUM CHLORIDE, SODIUM ASCORBATE AND ASCORBIC ACID 7.5-2.691G
100 KIT ORAL ONCE
Status: COMPLETED | OUTPATIENT
Start: 2019-10-31 | End: 2019-10-31

## 2019-10-31 RX ORDER — MEMANTINE HYDROCHLORIDE 5 MG/1
10 TABLET ORAL 2 TIMES DAILY
Status: DISCONTINUED | OUTPATIENT
Start: 2019-10-31 | End: 2019-11-03 | Stop reason: HOSPADM

## 2019-10-31 RX ORDER — ATORVASTATIN CALCIUM 40 MG/1
40 TABLET, FILM COATED ORAL DAILY
Status: DISCONTINUED | OUTPATIENT
Start: 2019-11-01 | End: 2019-11-03 | Stop reason: HOSPADM

## 2019-10-31 RX ADMIN — POTASSIUM CHLORIDE 20 MEQ: 29.8 INJECTION, SOLUTION INTRAVENOUS at 09:23

## 2019-10-31 RX ADMIN — PROPOFOL 50 MG: 10 INJECTION, EMULSION INTRAVENOUS at 15:25

## 2019-10-31 RX ADMIN — PROPOFOL 50 MG: 10 INJECTION, EMULSION INTRAVENOUS at 15:33

## 2019-10-31 RX ADMIN — DONEPEZIL HYDROCHLORIDE 10 MG: 5 TABLET, FILM COATED ORAL at 20:00

## 2019-10-31 RX ADMIN — TAZOBACTAM SODIUM AND PIPERACILLIN SODIUM 3.38 G: 375; 3 INJECTION, SOLUTION INTRAVENOUS at 13:26

## 2019-10-31 RX ADMIN — PROPOFOL 50 MG: 10 INJECTION, EMULSION INTRAVENOUS at 15:30

## 2019-10-31 RX ADMIN — SODIUM CHLORIDE: 9 INJECTION, SOLUTION INTRAVENOUS at 15:18

## 2019-10-31 RX ADMIN — MEMANTINE HYDROCHLORIDE 10 MG: 5 TABLET ORAL at 20:00

## 2019-10-31 RX ADMIN — POTASSIUM CHLORIDE 20 MEQ: 29.8 INJECTION, SOLUTION INTRAVENOUS at 10:23

## 2019-10-31 RX ADMIN — PROPOFOL 50 MG: 10 INJECTION, EMULSION INTRAVENOUS at 15:38

## 2019-10-31 RX ADMIN — TAZOBACTAM SODIUM AND PIPERACILLIN SODIUM 3.38 G: 375; 3 INJECTION, SOLUTION INTRAVENOUS at 20:00

## 2019-10-31 RX ADMIN — POLYETHYLENE GLYCOL 3350, SODIUM SULFATE, SODIUM CHLORIDE, POTASSIUM CHLORIDE, ASCORBIC ACID, SODIUM ASCORBATE 100 G: KIT at 10:42

## 2019-10-31 RX ADMIN — TAZOBACTAM SODIUM AND PIPERACILLIN SODIUM 3.38 G: 375; 3 INJECTION, SOLUTION INTRAVENOUS at 05:46

## 2019-10-31 RX ADMIN — PROPOFOL 50 MG: 10 INJECTION, EMULSION INTRAVENOUS at 15:24

## 2019-10-31 RX ADMIN — PROPOFOL 50 MG: 10 INJECTION, EMULSION INTRAVENOUS at 15:45

## 2019-10-31 RX ADMIN — SODIUM CHLORIDE 250 ML: 9 INJECTION, SOLUTION INTRAVENOUS at 00:18

## 2019-10-31 RX ADMIN — Medication 10 ML: at 09:23

## 2019-10-31 RX ADMIN — POLYETHYLENE GLYCOL 3350, SODIUM SULFATE, SODIUM CHLORIDE, POTASSIUM CHLORIDE, ASCORBIC ACID, SODIUM ASCORBATE 100 G: KIT at 09:23

## 2019-10-31 ASSESSMENT — LIFESTYLE VARIABLES: SMOKING_STATUS: 0

## 2019-10-31 ASSESSMENT — PAIN SCALES - GENERAL
PAINLEVEL_OUTOF10: 0

## 2019-10-31 ASSESSMENT — PAIN - FUNCTIONAL ASSESSMENT: PAIN_FUNCTIONAL_ASSESSMENT: 0-10

## 2019-11-01 LAB
ABO/RH: NORMAL
AMIODARONE LEVEL: 0.7 UG/ML (ref 0.5–2)
ANTIBODY SCREEN: NORMAL
BLOOD BANK DISPENSE STATUS: NORMAL
BLOOD BANK DISPENSE STATUS: NORMAL
BLOOD BANK PRODUCT CODE: NORMAL
BLOOD BANK PRODUCT CODE: NORMAL
BLOOD SMEAR REVIEW: NORMAL
BPU ID: NORMAL
BPU ID: NORMAL
DES-AMIOD: 0.8 UG/ML
DESCRIPTION BLOOD BANK: NORMAL
DESCRIPTION BLOOD BANK: NORMAL
HCT VFR BLD CALC: 20.1 % (ref 40.5–52.5)
HCT VFR BLD CALC: 20.2 % (ref 40.5–52.5)
HCT VFR BLD CALC: 21.2 % (ref 40.5–52.5)
HCT VFR BLD CALC: 27.5 % (ref 40.5–52.5)
HEMOGLOBIN: 6.7 G/DL (ref 13.5–17.5)
HEMOGLOBIN: 7.1 G/DL (ref 13.5–17.5)
HEMOGLOBIN: 9.1 G/DL (ref 13.5–17.5)
IMMATURE RETIC FRACT: 0.67 (ref 0.21–0.37)
RETICULOCYTE ABSOLUTE COUNT: 0.07 M/UL
RETICULOCYTE COUNT PCT: 3.04 % (ref 0.5–2.18)

## 2019-11-01 PROCEDURE — 82607 VITAMIN B-12: CPT

## 2019-11-01 PROCEDURE — 86850 RBC ANTIBODY SCREEN: CPT

## 2019-11-01 PROCEDURE — 36430 TRANSFUSION BLD/BLD COMPNT: CPT

## 2019-11-01 PROCEDURE — 85045 AUTOMATED RETICULOCYTE COUNT: CPT

## 2019-11-01 PROCEDURE — P9016 RBC LEUKOCYTES REDUCED: HCPCS

## 2019-11-01 PROCEDURE — 86900 BLOOD TYPING SEROLOGIC ABO: CPT

## 2019-11-01 PROCEDURE — 83010 ASSAY OF HAPTOGLOBIN QUANT: CPT

## 2019-11-01 PROCEDURE — 85014 HEMATOCRIT: CPT

## 2019-11-01 PROCEDURE — 83615 LACTATE (LD) (LDH) ENZYME: CPT

## 2019-11-01 PROCEDURE — 83540 ASSAY OF IRON: CPT

## 2019-11-01 PROCEDURE — 84155 ASSAY OF PROTEIN SERUM: CPT

## 2019-11-01 PROCEDURE — 85018 HEMOGLOBIN: CPT

## 2019-11-01 PROCEDURE — 84165 PROTEIN E-PHORESIS SERUM: CPT

## 2019-11-01 PROCEDURE — 1200000000 HC SEMI PRIVATE

## 2019-11-01 PROCEDURE — 2580000003 HC RX 258: Performed by: INTERNAL MEDICINE

## 2019-11-01 PROCEDURE — 82746 ASSAY OF FOLIC ACID SERUM: CPT

## 2019-11-01 PROCEDURE — 86901 BLOOD TYPING SEROLOGIC RH(D): CPT

## 2019-11-01 PROCEDURE — 86923 COMPATIBILITY TEST ELECTRIC: CPT

## 2019-11-01 PROCEDURE — 6370000000 HC RX 637 (ALT 250 FOR IP): Performed by: INTERNAL MEDICINE

## 2019-11-01 PROCEDURE — 2580000003 HC RX 258: Performed by: HOSPITALIST

## 2019-11-01 PROCEDURE — 2500000003 HC RX 250 WO HCPCS: Performed by: INTERNAL MEDICINE

## 2019-11-01 PROCEDURE — 36415 COLL VENOUS BLD VENIPUNCTURE: CPT

## 2019-11-01 PROCEDURE — 82728 ASSAY OF FERRITIN: CPT

## 2019-11-01 PROCEDURE — 83550 IRON BINDING TEST: CPT

## 2019-11-01 RX ORDER — POTASSIUM CHLORIDE 20 MEQ/1
40 TABLET, EXTENDED RELEASE ORAL EVERY 4 HOURS
Status: COMPLETED | OUTPATIENT
Start: 2019-11-01 | End: 2019-11-01

## 2019-11-01 RX ORDER — 0.9 % SODIUM CHLORIDE 0.9 %
250 INTRAVENOUS SOLUTION INTRAVENOUS ONCE
Status: COMPLETED | OUTPATIENT
Start: 2019-11-01 | End: 2019-11-01

## 2019-11-01 RX ORDER — SODIUM CHLORIDE 9 MG/ML
INJECTION, SOLUTION INTRAVENOUS
Status: DISPENSED
Start: 2019-11-01 | End: 2019-11-01

## 2019-11-01 RX ADMIN — SODIUM CHLORIDE 250 ML: 9 INJECTION, SOLUTION INTRAVENOUS at 09:09

## 2019-11-01 RX ADMIN — CYANOCOBALAMIN TAB 1000 MCG 1000 MCG: 1000 TAB at 09:08

## 2019-11-01 RX ADMIN — DONEPEZIL HYDROCHLORIDE 10 MG: 5 TABLET, FILM COATED ORAL at 20:50

## 2019-11-01 RX ADMIN — SODIUM CHLORIDE: 9 INJECTION, SOLUTION INTRAVENOUS at 05:10

## 2019-11-01 RX ADMIN — MEMANTINE HYDROCHLORIDE 10 MG: 5 TABLET ORAL at 20:50

## 2019-11-01 RX ADMIN — POTASSIUM CHLORIDE 40 MEQ: 1500 TABLET, EXTENDED RELEASE ORAL at 15:54

## 2019-11-01 RX ADMIN — DESMOPRESSIN ACETATE 40 MG: 0.2 TABLET ORAL at 09:08

## 2019-11-01 RX ADMIN — TAZOBACTAM SODIUM AND PIPERACILLIN SODIUM 3.38 G: 375; 3 INJECTION, SOLUTION INTRAVENOUS at 20:51

## 2019-11-01 RX ADMIN — Medication 10 ML: at 09:07

## 2019-11-01 RX ADMIN — Medication 10 ML: at 20:51

## 2019-11-01 RX ADMIN — TAZOBACTAM SODIUM AND PIPERACILLIN SODIUM 3.38 G: 375; 3 INJECTION, SOLUTION INTRAVENOUS at 05:09

## 2019-11-01 RX ADMIN — MEMANTINE HYDROCHLORIDE 10 MG: 5 TABLET ORAL at 09:08

## 2019-11-01 RX ADMIN — POTASSIUM CHLORIDE 40 MEQ: 1500 TABLET, EXTENDED RELEASE ORAL at 20:50

## 2019-11-01 RX ADMIN — TAZOBACTAM SODIUM AND PIPERACILLIN SODIUM 3.38 G: 375; 3 INJECTION, SOLUTION INTRAVENOUS at 15:41

## 2019-11-01 RX ADMIN — SODIUM CHLORIDE 250 ML: 9 INJECTION, SOLUTION INTRAVENOUS at 10:00

## 2019-11-01 ASSESSMENT — PAIN SCALES - GENERAL
PAINLEVEL_OUTOF10: 0

## 2019-11-02 LAB
ANION GAP SERPL CALCULATED.3IONS-SCNC: 8 MMOL/L (ref 3–16)
BUN BLDV-MCNC: 11 MG/DL (ref 7–20)
CALCIUM SERPL-MCNC: 8 MG/DL (ref 8.3–10.6)
CHLORIDE BLD-SCNC: 115 MMOL/L (ref 99–110)
CO2: 21 MMOL/L (ref 21–32)
CREAT SERPL-MCNC: 1.1 MG/DL (ref 0.8–1.3)
FERRITIN: 287.7 NG/ML (ref 30–400)
FOLATE: 4.61 NG/ML (ref 4.78–24.2)
GFR AFRICAN AMERICAN: >60
GFR NON-AFRICAN AMERICAN: >60
GLUCOSE BLD-MCNC: 96 MG/DL (ref 70–99)
HAPTOGLOBIN: 159 MG/DL (ref 30–200)
HCT VFR BLD CALC: 24.6 % (ref 40.5–52.5)
HCT VFR BLD CALC: 25.9 % (ref 40.5–52.5)
HCT VFR BLD CALC: 26.6 % (ref 40.5–52.5)
HEMOGLOBIN: 8.2 G/DL (ref 13.5–17.5)
HEMOGLOBIN: 8.5 G/DL (ref 13.5–17.5)
HEMOGLOBIN: 8.9 G/DL (ref 13.5–17.5)
IRON SATURATION: 24 % (ref 20–50)
IRON: 42 UG/DL (ref 59–158)
LACTATE DEHYDROGENASE: 229 U/L (ref 100–190)
POTASSIUM SERPL-SCNC: 4 MMOL/L (ref 3.5–5.1)
SODIUM BLD-SCNC: 144 MMOL/L (ref 136–145)
TOTAL IRON BINDING CAPACITY: 173 UG/DL (ref 260–445)
VITAMIN B-12: 1246 PG/ML (ref 211–911)

## 2019-11-02 PROCEDURE — 2580000003 HC RX 258: Performed by: INTERNAL MEDICINE

## 2019-11-02 PROCEDURE — 85018 HEMOGLOBIN: CPT

## 2019-11-02 PROCEDURE — 85014 HEMATOCRIT: CPT

## 2019-11-02 PROCEDURE — 6370000000 HC RX 637 (ALT 250 FOR IP): Performed by: INTERNAL MEDICINE

## 2019-11-02 PROCEDURE — 80048 BASIC METABOLIC PNL TOTAL CA: CPT

## 2019-11-02 PROCEDURE — 1200000000 HC SEMI PRIVATE

## 2019-11-02 PROCEDURE — 2500000003 HC RX 250 WO HCPCS: Performed by: INTERNAL MEDICINE

## 2019-11-02 RX ADMIN — CYANOCOBALAMIN TAB 1000 MCG 1000 MCG: 1000 TAB at 09:50

## 2019-11-02 RX ADMIN — DONEPEZIL HYDROCHLORIDE 10 MG: 5 TABLET, FILM COATED ORAL at 20:14

## 2019-11-02 RX ADMIN — MEMANTINE HYDROCHLORIDE 10 MG: 5 TABLET ORAL at 09:50

## 2019-11-02 RX ADMIN — TAZOBACTAM SODIUM AND PIPERACILLIN SODIUM 3.38 G: 375; 3 INJECTION, SOLUTION INTRAVENOUS at 05:13

## 2019-11-02 RX ADMIN — Medication 10 ML: at 15:57

## 2019-11-02 RX ADMIN — TAZOBACTAM SODIUM AND PIPERACILLIN SODIUM 3.38 G: 375; 3 INJECTION, SOLUTION INTRAVENOUS at 20:14

## 2019-11-02 RX ADMIN — Medication 10 ML: at 20:18

## 2019-11-02 RX ADMIN — DESMOPRESSIN ACETATE 40 MG: 0.2 TABLET ORAL at 09:50

## 2019-11-02 RX ADMIN — TAZOBACTAM SODIUM AND PIPERACILLIN SODIUM 3.38 G: 375; 3 INJECTION, SOLUTION INTRAVENOUS at 15:54

## 2019-11-02 RX ADMIN — MEMANTINE HYDROCHLORIDE 10 MG: 5 TABLET ORAL at 20:14

## 2019-11-02 ASSESSMENT — PAIN SCALES - GENERAL
PAINLEVEL_OUTOF10: 0
PAINLEVEL_OUTOF10: 0

## 2019-11-03 VITALS
WEIGHT: 256.84 LBS | HEART RATE: 70 BPM | BODY MASS INDEX: 34.04 KG/M2 | HEIGHT: 73 IN | DIASTOLIC BLOOD PRESSURE: 62 MMHG | RESPIRATION RATE: 27 BRPM | OXYGEN SATURATION: 98 % | TEMPERATURE: 98 F | SYSTOLIC BLOOD PRESSURE: 142 MMHG

## 2019-11-03 LAB
ANION GAP SERPL CALCULATED.3IONS-SCNC: 8 MMOL/L (ref 3–16)
BUN BLDV-MCNC: 13 MG/DL (ref 7–20)
CALCIUM SERPL-MCNC: 7.8 MG/DL (ref 8.3–10.6)
CHLORIDE BLD-SCNC: 114 MMOL/L (ref 99–110)
CO2: 21 MMOL/L (ref 21–32)
CREAT SERPL-MCNC: 1.1 MG/DL (ref 0.8–1.3)
FERRITIN: 258.8 NG/ML (ref 30–400)
GFR AFRICAN AMERICAN: >60
GFR NON-AFRICAN AMERICAN: >60
GLUCOSE BLD-MCNC: 88 MG/DL (ref 70–99)
HCT VFR BLD CALC: 24.9 % (ref 40.5–52.5)
HEMOGLOBIN: 8.1 G/DL (ref 13.5–17.5)
IRON SATURATION: 17 % (ref 20–50)
IRON: 33 UG/DL (ref 59–158)
LACTATE DEHYDROGENASE: 250 U/L (ref 100–190)
MCH RBC QN AUTO: 29.2 PG (ref 26–34)
MCHC RBC AUTO-ENTMCNC: 32.5 G/DL (ref 31–36)
MCV RBC AUTO: 89.6 FL (ref 80–100)
PDW BLD-RTO: 16.5 % (ref 12.4–15.4)
PLATELET # BLD: 110 K/UL (ref 135–450)
PMV BLD AUTO: 8.5 FL (ref 5–10.5)
POTASSIUM SERPL-SCNC: 3.6 MMOL/L (ref 3.5–5.1)
RBC # BLD: 2.78 M/UL (ref 4.2–5.9)
SODIUM BLD-SCNC: 143 MMOL/L (ref 136–145)
TOTAL IRON BINDING CAPACITY: 192 UG/DL (ref 260–445)
WBC # BLD: 8.4 K/UL (ref 4–11)

## 2019-11-03 PROCEDURE — 97530 THERAPEUTIC ACTIVITIES: CPT

## 2019-11-03 PROCEDURE — 97116 GAIT TRAINING THERAPY: CPT

## 2019-11-03 PROCEDURE — 85027 COMPLETE CBC AUTOMATED: CPT

## 2019-11-03 PROCEDURE — 2580000003 HC RX 258: Performed by: INTERNAL MEDICINE

## 2019-11-03 PROCEDURE — 97535 SELF CARE MNGMENT TRAINING: CPT

## 2019-11-03 PROCEDURE — 94760 N-INVAS EAR/PLS OXIMETRY 1: CPT

## 2019-11-03 PROCEDURE — 2500000003 HC RX 250 WO HCPCS: Performed by: INTERNAL MEDICINE

## 2019-11-03 PROCEDURE — 6370000000 HC RX 637 (ALT 250 FOR IP): Performed by: INTERNAL MEDICINE

## 2019-11-03 PROCEDURE — 83550 IRON BINDING TEST: CPT

## 2019-11-03 PROCEDURE — 82728 ASSAY OF FERRITIN: CPT

## 2019-11-03 PROCEDURE — 83540 ASSAY OF IRON: CPT

## 2019-11-03 PROCEDURE — 80048 BASIC METABOLIC PNL TOTAL CA: CPT

## 2019-11-03 PROCEDURE — 83615 LACTATE (LD) (LDH) ENZYME: CPT

## 2019-11-03 RX ORDER — METOPROLOL SUCCINATE 25 MG/1
12.5 TABLET, EXTENDED RELEASE ORAL 2 TIMES DAILY
Qty: 30 TABLET | Refills: 3 | Status: SHIPPED | OUTPATIENT
Start: 2019-11-03

## 2019-11-03 RX ORDER — FUROSEMIDE 40 MG/1
20 TABLET ORAL DAILY
Qty: 60 TABLET | Refills: 3 | Status: SHIPPED | OUTPATIENT
Start: 2019-11-03

## 2019-11-03 RX ORDER — ALBUTEROL SULFATE 90 UG/1
2 AEROSOL, METERED RESPIRATORY (INHALATION) EVERY 4 HOURS PRN
Qty: 1 INHALER | Refills: 0 | Status: SHIPPED | OUTPATIENT
Start: 2019-11-03 | End: 2019-11-03

## 2019-11-03 RX ORDER — FOLIC ACID 1 MG/1
1 TABLET ORAL DAILY
Status: DISCONTINUED | OUTPATIENT
Start: 2019-11-03 | End: 2019-11-03 | Stop reason: HOSPADM

## 2019-11-03 RX ORDER — AMOXICILLIN AND CLAVULANATE POTASSIUM 500; 125 MG/1; MG/1
1 TABLET, FILM COATED ORAL 3 TIMES DAILY
Qty: 15 TABLET | Refills: 0 | Status: SHIPPED | OUTPATIENT
Start: 2019-11-03 | End: 2019-11-08

## 2019-11-03 RX ORDER — ALBUTEROL SULFATE 90 UG/1
2 AEROSOL, METERED RESPIRATORY (INHALATION) EVERY 4 HOURS PRN
Qty: 1 INHALER | Refills: 0 | Status: SHIPPED | OUTPATIENT
Start: 2019-11-03

## 2019-11-03 RX ADMIN — Medication 10 ML: at 09:37

## 2019-11-03 RX ADMIN — TAZOBACTAM SODIUM AND PIPERACILLIN SODIUM 3.38 G: 375; 3 INJECTION, SOLUTION INTRAVENOUS at 13:09

## 2019-11-03 RX ADMIN — CYANOCOBALAMIN TAB 1000 MCG 1000 MCG: 1000 TAB at 09:37

## 2019-11-03 RX ADMIN — TAZOBACTAM SODIUM AND PIPERACILLIN SODIUM 3.38 G: 375; 3 INJECTION, SOLUTION INTRAVENOUS at 05:19

## 2019-11-03 RX ADMIN — MEMANTINE HYDROCHLORIDE 10 MG: 5 TABLET ORAL at 09:36

## 2019-11-03 RX ADMIN — Medication 10 ML: at 05:20

## 2019-11-03 RX ADMIN — DESMOPRESSIN ACETATE 40 MG: 0.2 TABLET ORAL at 09:36

## 2019-11-03 ASSESSMENT — PAIN SCALES - GENERAL
PAINLEVEL_OUTOF10: 0

## 2019-11-04 LAB
ALBUMIN SERPL-MCNC: 2.4 G/DL (ref 3.1–4.9)
ALPHA-1-GLOBULIN: 0.2 G/DL (ref 0.2–0.4)
ALPHA-2-GLOBULIN: 0.6 G/DL (ref 0.4–1.1)
BETA GLOBULIN: 0.7 G/DL (ref 0.9–1.6)
GAMMA GLOBULIN: 0.5 G/DL (ref 0.6–1.8)
SPE/IFE INTERPRETATION: NORMAL
TOTAL PROTEIN: 4.4 G/DL (ref 6.4–8.2)

## 2020-03-27 ENCOUNTER — HOSPITAL ENCOUNTER (OUTPATIENT)
Age: 85
Discharge: HOME | End: 2020-03-27
Payer: MEDICARE

## 2020-03-27 LAB
BASOPHILS ABSOLUTE: 0.1 K/UL (ref 0–0.2)
BASOPHILS RELATIVE PERCENT: 0.9 %
EOSINOPHILS ABSOLUTE: 0.1 K/UL (ref 0–0.6)
EOSINOPHILS RELATIVE PERCENT: 2 %
HCT VFR BLD CALC: 29.5 % (ref 40.5–52.5)
HEMOGLOBIN: 9.3 G/DL (ref 13.5–17.5)
LYMPHOCYTES ABSOLUTE: 1 K/UL (ref 1–5.1)
LYMPHOCYTES RELATIVE PERCENT: 15.1 %
MCH RBC QN AUTO: 27.5 PG (ref 26–34)
MCHC RBC AUTO-ENTMCNC: 31.5 G/DL (ref 31–36)
MCV RBC AUTO: 87.2 FL (ref 80–100)
MONOCYTES ABSOLUTE: 0.6 K/UL (ref 0–1.3)
MONOCYTES RELATIVE PERCENT: 8.3 %
NEUTROPHILS ABSOLUTE: 5 K/UL (ref 1.7–7.7)
NEUTROPHILS RELATIVE PERCENT: 73.7 %
PDW BLD-RTO: 23.5 % (ref 12.4–15.4)
PLATELET # BLD: 201 K/UL (ref 135–450)
PMV BLD AUTO: 8.7 FL (ref 5–10.5)
RBC # BLD: 3.38 M/UL (ref 4.2–5.9)
WBC # BLD: 6.8 K/UL (ref 4–11)

## 2020-03-27 PROCEDURE — 36415 COLL VENOUS BLD VENIPUNCTURE: CPT

## 2020-03-27 PROCEDURE — 85025 COMPLETE CBC W/AUTO DIFF WBC: CPT

## 2023-11-08 ENCOUNTER — HOSPITAL ENCOUNTER (INPATIENT)
Age: 88
LOS: 12 days | Discharge: NURSING, REHAB OR LONG-TERM CARE | DRG: 522 | End: 2023-11-20
Attending: EMERGENCY MEDICINE | Admitting: INTERNAL MEDICINE
Payer: MEDICARE

## 2023-11-08 ENCOUNTER — APPOINTMENT (OUTPATIENT)
Dept: CT IMAGING | Age: 88
DRG: 522 | End: 2023-11-08
Payer: MEDICARE

## 2023-11-08 ENCOUNTER — APPOINTMENT (OUTPATIENT)
Dept: GENERAL RADIOLOGY | Age: 88
DRG: 522 | End: 2023-11-08
Payer: MEDICARE

## 2023-11-08 DIAGNOSIS — S72.002A CLOSED FRACTURE OF LEFT HIP, INITIAL ENCOUNTER (HCC): Primary | ICD-10-CM

## 2023-11-08 LAB
ALBUMIN SERPL-MCNC: 4.3 G/DL (ref 3.4–5)
ALBUMIN/GLOB SERPL: 1.6 {RATIO} (ref 1.1–2.2)
ALP SERPL-CCNC: 69 U/L (ref 40–129)
ALT SERPL-CCNC: 25 U/L (ref 10–40)
ANION GAP SERPL CALCULATED.3IONS-SCNC: 15 MMOL/L (ref 3–16)
APTT BLD: 30.6 SEC (ref 22.7–35.9)
AST SERPL-CCNC: 30 U/L (ref 15–37)
BASOPHILS # BLD: 0 K/UL (ref 0–0.2)
BASOPHILS NFR BLD: 0.3 %
BILIRUB SERPL-MCNC: 1.1 MG/DL (ref 0–1)
BUN SERPL-MCNC: 19 MG/DL (ref 7–20)
CALCIUM SERPL-MCNC: 9 MG/DL (ref 8.3–10.6)
CHLORIDE SERPL-SCNC: 103 MMOL/L (ref 99–110)
CO2 SERPL-SCNC: 22 MMOL/L (ref 21–32)
CREAT SERPL-MCNC: 1 MG/DL (ref 0.8–1.3)
DEPRECATED RDW RBC AUTO: 15.6 % (ref 12.4–15.4)
EKG ATRIAL RATE: 76 BPM
EKG DIAGNOSIS: NORMAL
EKG Q-T INTERVAL: 456 MS
EKG QRS DURATION: 114 MS
EKG QTC CALCULATION (BAZETT): 484 MS
EKG R AXIS: -49 DEGREES
EKG T AXIS: 80 DEGREES
EKG VENTRICULAR RATE: 68 BPM
EOSINOPHIL # BLD: 0 K/UL (ref 0–0.6)
EOSINOPHIL NFR BLD: 0.1 %
GFR SERPLBLD CREATININE-BSD FMLA CKD-EPI: >60 ML/MIN/{1.73_M2}
GLUCOSE SERPL-MCNC: 109 MG/DL (ref 70–99)
HCT VFR BLD AUTO: 35.6 % (ref 40.5–52.5)
HGB BLD-MCNC: 11.5 G/DL (ref 13.5–17.5)
INR PPP: 1.13 (ref 0.84–1.16)
LYMPHOCYTES # BLD: 0.6 K/UL (ref 1–5.1)
LYMPHOCYTES NFR BLD: 6.9 %
MCH RBC QN AUTO: 27.5 PG (ref 26–34)
MCHC RBC AUTO-ENTMCNC: 32.3 G/DL (ref 31–36)
MCV RBC AUTO: 85.2 FL (ref 80–100)
MONOCYTES # BLD: 0.5 K/UL (ref 0–1.3)
MONOCYTES NFR BLD: 5.4 %
NEUTROPHILS # BLD: 7.3 K/UL (ref 1.7–7.7)
NEUTROPHILS NFR BLD: 87.3 %
PLATELET # BLD AUTO: 127 K/UL (ref 135–450)
PMV BLD AUTO: 9.5 FL (ref 5–10.5)
POTASSIUM SERPL-SCNC: 3.7 MMOL/L (ref 3.5–5.1)
PROT SERPL-MCNC: 7 G/DL (ref 6.4–8.2)
PROTHROMBIN TIME: 14.6 SEC (ref 11.5–14.8)
RBC # BLD AUTO: 4.17 M/UL (ref 4.2–5.9)
SODIUM SERPL-SCNC: 140 MMOL/L (ref 136–145)
WBC # BLD AUTO: 8.4 K/UL (ref 4–11)

## 2023-11-08 PROCEDURE — 85025 COMPLETE CBC W/AUTO DIFF WBC: CPT

## 2023-11-08 PROCEDURE — 99285 EMERGENCY DEPT VISIT HI MDM: CPT

## 2023-11-08 PROCEDURE — APPNB45 APP NON BILLABLE 31-45 MINUTES: Performed by: NURSE PRACTITIONER

## 2023-11-08 PROCEDURE — 73502 X-RAY EXAM HIP UNI 2-3 VIEWS: CPT

## 2023-11-08 PROCEDURE — 6360000002 HC RX W HCPCS: Performed by: EMERGENCY MEDICINE

## 2023-11-08 PROCEDURE — 93005 ELECTROCARDIOGRAM TRACING: CPT | Performed by: EMERGENCY MEDICINE

## 2023-11-08 PROCEDURE — 85610 PROTHROMBIN TIME: CPT

## 2023-11-08 PROCEDURE — 85730 THROMBOPLASTIN TIME PARTIAL: CPT

## 2023-11-08 PROCEDURE — 96374 THER/PROPH/DIAG INJ IV PUSH: CPT

## 2023-11-08 PROCEDURE — 80053 COMPREHEN METABOLIC PANEL: CPT

## 2023-11-08 PROCEDURE — 73030 X-RAY EXAM OF SHOULDER: CPT

## 2023-11-08 PROCEDURE — 70450 CT HEAD/BRAIN W/O DYE: CPT

## 2023-11-08 PROCEDURE — 71045 X-RAY EXAM CHEST 1 VIEW: CPT

## 2023-11-08 PROCEDURE — 93010 ELECTROCARDIOGRAM REPORT: CPT | Performed by: INTERNAL MEDICINE

## 2023-11-08 PROCEDURE — 1200000000 HC SEMI PRIVATE

## 2023-11-08 RX ORDER — AMLODIPINE BESYLATE 10 MG/1
10 TABLET ORAL DAILY
COMMUNITY
Start: 2023-11-03

## 2023-11-08 RX ORDER — OXYCODONE HYDROCHLORIDE AND ACETAMINOPHEN 5; 325 MG/1; MG/1
1 TABLET ORAL EVERY 4 HOURS PRN
Status: DISCONTINUED | OUTPATIENT
Start: 2023-11-08 | End: 2023-11-09

## 2023-11-08 RX ORDER — MORPHINE SULFATE 4 MG/ML
4 INJECTION, SOLUTION INTRAMUSCULAR; INTRAVENOUS
Status: COMPLETED | OUTPATIENT
Start: 2023-11-08 | End: 2023-11-08

## 2023-11-08 RX ORDER — PANTOPRAZOLE SODIUM 40 MG/1
40 TABLET, DELAYED RELEASE ORAL DAILY
Status: ON HOLD | COMMUNITY
Start: 2023-10-19 | End: 2023-11-13 | Stop reason: HOSPADM

## 2023-11-08 RX ADMIN — MORPHINE SULFATE 4 MG: 4 INJECTION, SOLUTION INTRAMUSCULAR; INTRAVENOUS at 10:44

## 2023-11-08 ASSESSMENT — PAIN SCALES - GENERAL
PAINLEVEL_OUTOF10: 10
PAINLEVEL_OUTOF10: 10

## 2023-11-08 ASSESSMENT — PAIN DESCRIPTION - LOCATION: LOCATION: HIP

## 2023-11-08 ASSESSMENT — LIFESTYLE VARIABLES
HOW MANY STANDARD DRINKS CONTAINING ALCOHOL DO YOU HAVE ON A TYPICAL DAY: PATIENT DOES NOT DRINK
HOW OFTEN DO YOU HAVE A DRINK CONTAINING ALCOHOL: NEVER

## 2023-11-08 ASSESSMENT — PAIN - FUNCTIONAL ASSESSMENT: PAIN_FUNCTIONAL_ASSESSMENT: 0-10

## 2023-11-08 ASSESSMENT — PAIN DESCRIPTION - ORIENTATION: ORIENTATION: LEFT

## 2023-11-08 NOTE — ED NOTES
I assisted pt with using urinal, pt voided 100 cc of concentrated urine  Pt reports pain 7/10  Repositioned  Son at bedside, updated on room status.       Red Souza RN  11/08/23 5180
Pt c/o left hip pain  Left leg is shortened and rotated externally rotated  Pt c/o 10/10  Pt also reports left shoulder pain, no bruising or deformity noted. Call light present  Fall precautions in place.       Donovan Khan RN  11/08/23 2770
Cancer [facility])     prostate cancer    Cellulitis     hx of left leg    CHF (congestive heart failure) ([address])     Hypertension     Morbid obesity (HCC)        Assessment    Vitals/MEWS: MEWS Score: 1  Level of Consciousness: Alert (0)   Vitals:    11/08/23 1130 11/08/23 1145 11/08/23 1213 11/08/23 1313   BP: (!) 132/57 127/64 135/80 121/69   Pulse:       Resp:       Temp:       TempSrc:       SpO2: 91%  96% 95%   Weight:       Height:         FiO2 (%):   O2 Flow Rate: O2 Device: None (Room air)    Cardiac Rhythm:    Pain Assessment:  [x] Verbal [] Joneen Carney Scale  Pain Scale: Pain Assessment  Pain Assessment: 0-10  Pain Level: 10  Pain Location: Hip  Pain Orientation: Left  Last documented pain score (0-10 scale) Pain Level: 10  Last documented pain medication administered:   Mental Status: oriented  Orientation Level:    NIH Score:    C-SSRS: Risk of Suicide: No Risk  Bedside swallow:    Wilmot Coma Scale (GCS): Cuong Coma Scale  Eye Opening: Spontaneous  Best Verbal Response: Oriented  Best Motor Response: Obeys commands  Wilmot Coma Scale Score: 15  Active LDA's:   Peripheral IV 11/08/23 Distal;Right; Anterior Cephalic (Active)   Site Assessment Clean, dry & intact; Clean;Dry; Intact 11/08/23 1031     PO Status: Regular  Pertinent or High Risk Medications/Drips: no   If Yes, please provide details:   Pending Blood Product Administration: no       You may also review the ED PT Care Timeline found under the Summary Nursing Index tab. Recommendation    Pending orders   Plan for Discharge (if known):    Additional Comments:  If any further questions, please call Sending RN at [phone]    Electronically signed by: Electronically signed by Tristan Ulloa RN on 11/8/2023 at 2:39 PM       Tristan Ulloa RN  11/08/23 6289
Date    Atrial fibrillation ([address])     Cancer [facility])     prostate cancer    Cellulitis     hx of left leg    CHF (congestive heart failure) ([address])     Diverticulitis     Hypertension     Morbid obesity ([address])        Assessment    Vitals/MEWS: MEWS Score: 1  Level of Consciousness: Alert (0)   Vitals:    11/08/23 1443 11/08/23 1600 11/08/23 1629 11/08/23 1704   BP: 116/66 (!) 145/65 133/67 135/79   Pulse:       Resp:       Temp:       TempSrc:       SpO2: 91% 94% 95% 94%   Weight:       Height:         FiO2 (%): n/a  O2 Flow Rate: O2 Device: None (Room air)    Cardiac Rhythm:  n/a  Pain Assessment: 7 [x] Verbal [] Wilfrid Craft Scale  Pain Scale: Pain Assessment  Pain Assessment: 0-10  Pain Level: 10  Pain Location: Hip  Pain Orientation: Left  Last documented pain score (0-10 scale) Pain Level: 10  Last documented pain medication administered: see epic flow sheet  Mental Status: alert  Orientation Level:    NIH Score:    C-SSRS: Risk of Suicide: No Risk  Bedside swallow:    Owanka Coma Scale (GCS): Owanka Coma Scale  Eye Opening: Spontaneous  Best Verbal Response: Oriented  Best Motor Response: Obeys commands  Cuong Coma Scale Score: 15  Active LDA's:   Peripheral IV 11/08/23 Distal;Right; Anterior Cephalic (Active)   Site Assessment Clean, dry & intact; Clean;Dry; Intact 11/08/23 1031     PO Status: Regular  Pertinent or High Risk Medications/Drips: no   If Yes, please provide details: n/a  Pending Blood Product Administration: no       You may also review the ED PT Care Timeline found under the Summary Nursing Index tab. Recommendation    Pending orders none  Plan for Discharge (if known):    Additional Comments: none   If any further questions, please call Sending RN at [phone]    Electronically signed by: Electronically signed by Adriana Melchor RN on 11/8/2023 at 6:29 PM      Moises Dudley RN  11/08/23 7848

## 2023-11-08 NOTE — CARE COORDINATION
Case Management received a voicemail from [facility] at [address] [place] [address],[address] stating they are following patient.     Electronically signed by ÁNGEL Ashton, YAMILETH on 11/8/2023 at 6:28 PM

## 2023-11-08 NOTE — H&P
HOSPITALISTS HISTORY AND PHYSICAL    11/8/2023 1:28 PM    Patient Information:  Belen Dias is a 80 y.o. male [id number]  PCP:  Alva Thomas (Tel: [phone] )    Chief complaint:    Chief Complaint   Patient presents with    Hip Pain     Pt brought in per [facility] from home, pt reports he tripped on the carpet this am, falling, c/o left hip pain. No Loc. History of Present Illness:  Belen Dias is a 80 y.o. male who was walking at home when he tripped and fell to the carpeted no loss of conscious chest pain lightheadedness nausea vomiting dizziness with this patient has severe left hip pain thus was brought to the ED. Patient does have a history of A-fib not on anticoagulation  REVIEW OF SYSTEMS:   Constitutional: Negative for fever,chills or night sweats  ENT: Negative for rhinorrhea, epistaxis, hoarseness, sore throat. Respiratory: Negative for shortness of breath,wheezing  Cardiovascular: Negative for chest pain, palpitations   Gastrointestinal: Negative for nausea, vomiting, diarrhea  Genitourinary: Negative for polyuria, dysuria   Hematologic/Lymphatic: Negative for bleeding tendency, easy bruising  =  Neurologic: Negative for confusion,dysarthria. Skin: Negative for itching,rash  Psychiatric: Negative for depression,anxiety, agitation. Endocrine: Negative for polydipsia,polyuria,heat /cold intolerance. Past Medical History:   has a past medical history of Atrial fibrillation ([address]), Cancer ([address]), Cellulitis, CHF (congestive heart failure) ([address]), Hypertension, and Morbid obesity ([address]). Past Surgical History:   has a past surgical history that includes joint replacement; Abdomen surgery; Colonoscopy (N/A, 10/29/2019); Upper gastrointestinal endoscopy (N/A, 10/31/2019); and Colonoscopy (N/A, 10/31/2019).      Medications:  No current facility-administered medications on file prior

## 2023-11-08 NOTE — CONSULTS
[facility] Orthopedic Surgery  Consult Note    Patient: Annabella Norton  Admit Date: 11/8/2023  Requesting Physician: Berry Mccann MD  Room: ED-0015/15    Chief complaint: LEFT hip pain    HPI: Annabella Norton is a 80 y.o. male who presented to [facility] ER today after mechanical fall. Describes pain in the left hip of moderate to severe intensity and of sharp nature since the fall which is relieved by rest and narcotics partially. Denies new numbness/tingling. Independent imaging review of the left hip via plain films demonstrated: displaced femoral neck fracture. Patient lives with granddaughter and spouse and uses cane to ambulate. Relevant notes, labs and other tests reviewed. Medical History:  Past Medical History:   Diagnosis Date    Atrial fibrillation ([address])     Cancer ([address])     prostate cancer    Cellulitis     hx of left leg    CHF (congestive heart failure) ([address])     Hypertension     Morbid obesity ([address])      Past Surgical History:   Procedure Laterality Date    ABDOMEN SURGERY      COLONOSCOPY N/A 10/29/2019    COLONOSCOPY DIAGNOSTIC performed by Jaiden Perez MD at [facility] N/A 10/31/2019    COLONOSCOPY POLYPECTOMY SNARE/COLD BIOPSY performed by Jaiden Perez MD at [address]      knee    UPPER GASTROINTESTINAL ENDOSCOPY N/A 10/31/2019    EGD DIAGNOSTIC ONLY performed by Jaiden Perez MD at [address] History:    reports that he has never smoked. He has never used smokeless tobacco.    Family History:        Problem Relation Age of Onset    Cancer Brother     Hypertension Mother     Hypertension Sister        Medications:  ALL MEDICATIONS HAVE BEEN REVIEWED:  Scheduled:  Continuous:  PRN:    Allergies:    Allergies   Allergen Reactions    Anesthetics, Sarah Beth     Aromatic Inhalants     Lidocaine     Turpentine Oil     Camph-Eucalypt-Men-Turp-Pet Rash       Review of Systems:  Constitutional: Negative for fever,

## 2023-11-08 NOTE — ACP (ADVANCE CARE PLANNING)
Advanced Care Planning Note. Purpose of Encounter: Advanced care planning in light of hospitalization  Parties In Attendance: Patient,  Son  Decisional Capacity: Yes  Subjective: Patient  and family understand that this conversation is to address long term care goal  Objective: Patient admitted to hospital with left hip fracture secondary to mechanical fall history of A-fib  Goals of Care Determination: Patient would pursue CPR and Intubation if required.  Would consider surgery if required  Code Status: full code  Time spent on Advanced care Plannin minutes  Advanced Care Planning Documents: documented patient's wishes, would like children Jody Griffin simmons, willie to make decision if he is unable to    Pegmanasa Cueva MD  2023 1:30 PM

## 2023-11-08 NOTE — ED PROVIDER NOTES
EMERGENCY MEDICINE ATTENDING NOTE  Diane Watson. Palomo Ruel., DO, FACEP, [address]  [place] COMPLAINT  Chief Complaint   Patient presents with    Hip Pain     Pt brought in per [facility] EMS from home, pt reports he tripped on the carpet this am, falling, c/o left hip pain. No Loc. HISTORY OF PRESENT ILLNESS  Jefferson Luna is a 80 y.o. male who presents to the ED for evaluation of injury after fall. Patient was at his home walking. He was carrying his cane and lost his balance and fell onto his left side. Is having significant pain in the left hip but also some slight pain in the left shoulder. Was unable to get up afterwards and was brought in by EMS. He denies hitting his head but does have a history of dementia and is on Xarelto for his A-fib. Denies any other symptoms at this time. Has difficulty moving the hip. Denies numbness or tingling or weakness. Nursing/triage notes reviewed. No other complaints, modifying factors or associated symptoms. REVIEW OF SYSTEMS:  All systems are reviewed and are negative unless noted in the HPI.     PAST MEDICAL HISTORY  Past Medical History:   Diagnosis Date    Atrial fibrillation ([address])     Cancer [facility])     prostate cancer    Cellulitis     hx of left leg    CHF (congestive heart failure) ([address])     Hypertension     Morbid obesity ([address])        SURGICAL HISTORY  Past Surgical History:   Procedure Laterality Date    ABDOMEN SURGERY      COLONOSCOPY N/A 10/29/2019    COLONOSCOPY DIAGNOSTIC performed by Kamran Cardoso MD at [facility] N/A 10/31/2019    COLONOSCOPY POLYPECTOMY SNARE/COLD BIOPSY performed by [facility]ran [facility]oso MD at [facility]    UPPER GASTROINTESTINAL ENDOSCOPY N/A 10/31/2019    EGD DIAGNOSTIC ONLY performed by Kamran [facility]oso MD at [facility]  Family History   Problem Relation Age of Onset    Cancer Brother     Hypertension Mother     Hypertension Sister

## 2023-11-09 ENCOUNTER — ANESTHESIA EVENT (OUTPATIENT)
Dept: OPERATING ROOM | Age: 88
End: 2023-11-09
Payer: MEDICARE

## 2023-11-09 LAB
ANION GAP SERPL CALCULATED.3IONS-SCNC: 13 MMOL/L (ref 3–16)
BASOPHILS # BLD: 0 K/UL (ref 0–0.2)
BASOPHILS NFR BLD: 0.3 %
BUN SERPL-MCNC: 14 MG/DL (ref 7–20)
CALCIUM SERPL-MCNC: 8.6 MG/DL (ref 8.3–10.6)
CHLORIDE SERPL-SCNC: 110 MMOL/L (ref 99–110)
CO2 SERPL-SCNC: 21 MMOL/L (ref 21–32)
CREAT SERPL-MCNC: 0.8 MG/DL (ref 0.8–1.3)
DEPRECATED RDW RBC AUTO: 15.9 % (ref 12.4–15.4)
EOSINOPHIL # BLD: 0.1 K/UL (ref 0–0.6)
EOSINOPHIL NFR BLD: 1 %
GFR SERPLBLD CREATININE-BSD FMLA CKD-EPI: >60 ML/MIN/{1.73_M2}
GLUCOSE SERPL-MCNC: 105 MG/DL (ref 70–99)
HCT VFR BLD AUTO: 34.3 % (ref 40.5–52.5)
HGB BLD-MCNC: 11.3 G/DL (ref 13.5–17.5)
LYMPHOCYTES # BLD: 0.7 K/UL (ref 1–5.1)
LYMPHOCYTES NFR BLD: 9.1 %
MAGNESIUM SERPL-MCNC: 2.2 MG/DL (ref 1.8–2.4)
MCH RBC QN AUTO: 27.8 PG (ref 26–34)
MCHC RBC AUTO-ENTMCNC: 32.8 G/DL (ref 31–36)
MCV RBC AUTO: 84.9 FL (ref 80–100)
MONOCYTES # BLD: 0.6 K/UL (ref 0–1.3)
MONOCYTES NFR BLD: 7.2 %
NEUTROPHILS # BLD: 6.7 K/UL (ref 1.7–7.7)
NEUTROPHILS NFR BLD: 82.4 %
PLATELET # BLD AUTO: 120 K/UL (ref 135–450)
PMV BLD AUTO: 9.8 FL (ref 5–10.5)
POTASSIUM SERPL-SCNC: 3.5 MMOL/L (ref 3.5–5.1)
RBC # BLD AUTO: 4.05 M/UL (ref 4.2–5.9)
SODIUM SERPL-SCNC: 144 MMOL/L (ref 136–145)
WBC # BLD AUTO: 8.1 K/UL (ref 4–11)

## 2023-11-09 PROCEDURE — 6370000000 HC RX 637 (ALT 250 FOR IP): Performed by: INTERNAL MEDICINE

## 2023-11-09 PROCEDURE — 36415 COLL VENOUS BLD VENIPUNCTURE: CPT

## 2023-11-09 PROCEDURE — 85025 COMPLETE CBC W/AUTO DIFF WBC: CPT

## 2023-11-09 PROCEDURE — 6360000002 HC RX W HCPCS: Performed by: INTERNAL MEDICINE

## 2023-11-09 PROCEDURE — 83735 ASSAY OF MAGNESIUM: CPT

## 2023-11-09 PROCEDURE — 1200000000 HC SEMI PRIVATE

## 2023-11-09 PROCEDURE — 6370000000 HC RX 637 (ALT 250 FOR IP): Performed by: NURSE PRACTITIONER

## 2023-11-09 PROCEDURE — 2580000003 HC RX 258: Performed by: INTERNAL MEDICINE

## 2023-11-09 PROCEDURE — 80048 BASIC METABOLIC PNL TOTAL CA: CPT

## 2023-11-09 PROCEDURE — APPNB45 APP NON BILLABLE 31-45 MINUTES: Performed by: NURSE PRACTITIONER

## 2023-11-09 RX ORDER — DONEPEZIL HYDROCHLORIDE 5 MG/1
10 TABLET, FILM COATED ORAL NIGHTLY
Status: DISCONTINUED | OUTPATIENT
Start: 2023-11-09 | End: 2023-11-20 | Stop reason: HOSPADM

## 2023-11-09 RX ORDER — PANTOPRAZOLE SODIUM 40 MG/1
40 TABLET, DELAYED RELEASE ORAL DAILY
Status: DISCONTINUED | OUTPATIENT
Start: 2023-11-09 | End: 2023-11-09

## 2023-11-09 RX ORDER — ACETAMINOPHEN 650 MG/1
650 SUPPOSITORY RECTAL EVERY 6 HOURS PRN
Status: DISCONTINUED | OUTPATIENT
Start: 2023-11-09 | End: 2023-11-20 | Stop reason: HOSPADM

## 2023-11-09 RX ORDER — ONDANSETRON 2 MG/ML
4 INJECTION INTRAMUSCULAR; INTRAVENOUS EVERY 6 HOURS PRN
Status: DISCONTINUED | OUTPATIENT
Start: 2023-11-09 | End: 2023-11-20 | Stop reason: HOSPADM

## 2023-11-09 RX ORDER — AMLODIPINE BESYLATE 5 MG/1
10 TABLET ORAL DAILY
Status: DISCONTINUED | OUTPATIENT
Start: 2023-11-09 | End: 2023-11-20 | Stop reason: HOSPADM

## 2023-11-09 RX ORDER — SODIUM CHLORIDE 0.9 % (FLUSH) 0.9 %
5-40 SYRINGE (ML) INJECTION EVERY 12 HOURS SCHEDULED
Status: DISCONTINUED | OUTPATIENT
Start: 2023-11-09 | End: 2023-11-20 | Stop reason: HOSPADM

## 2023-11-09 RX ORDER — ENOXAPARIN SODIUM 100 MG/ML
40 INJECTION SUBCUTANEOUS DAILY
Status: DISCONTINUED | OUTPATIENT
Start: 2023-11-09 | End: 2023-11-10

## 2023-11-09 RX ORDER — ACETAMINOPHEN 325 MG/1
650 TABLET ORAL EVERY 6 HOURS PRN
Status: DISCONTINUED | OUTPATIENT
Start: 2023-11-09 | End: 2023-11-20 | Stop reason: HOSPADM

## 2023-11-09 RX ORDER — OXYCODONE HYDROCHLORIDE 5 MG/1
5 TABLET ORAL EVERY 4 HOURS PRN
Status: DISCONTINUED | OUTPATIENT
Start: 2023-11-09 | End: 2023-11-20 | Stop reason: HOSPADM

## 2023-11-09 RX ORDER — ASPIRIN 81 MG/1
81 TABLET, CHEWABLE ORAL 2 TIMES DAILY
Qty: 60 TABLET | Refills: 0 | Status: SHIPPED | OUTPATIENT
Start: 2023-11-09 | End: 2023-12-09

## 2023-11-09 RX ORDER — ATORVASTATIN CALCIUM 40 MG/1
40 TABLET, FILM COATED ORAL DAILY
Status: DISCONTINUED | OUTPATIENT
Start: 2023-11-09 | End: 2023-11-20 | Stop reason: HOSPADM

## 2023-11-09 RX ORDER — POTASSIUM CHLORIDE 7.45 MG/ML
10 INJECTION INTRAVENOUS PRN
Status: DISCONTINUED | OUTPATIENT
Start: 2023-11-09 | End: 2023-11-20 | Stop reason: HOSPADM

## 2023-11-09 RX ORDER — POLYETHYLENE GLYCOL 3350 17 G/17G
17 POWDER, FOR SOLUTION ORAL DAILY PRN
Status: DISCONTINUED | OUTPATIENT
Start: 2023-11-09 | End: 2023-11-20 | Stop reason: HOSPADM

## 2023-11-09 RX ORDER — ACETAMINOPHEN 325 MG/1
650 TABLET ORAL 3 TIMES DAILY
Status: DISCONTINUED | OUTPATIENT
Start: 2023-11-09 | End: 2023-11-20 | Stop reason: HOSPADM

## 2023-11-09 RX ORDER — MAGNESIUM SULFATE IN WATER 40 MG/ML
2000 INJECTION, SOLUTION INTRAVENOUS PRN
Status: DISCONTINUED | OUTPATIENT
Start: 2023-11-09 | End: 2023-11-20 | Stop reason: HOSPADM

## 2023-11-09 RX ORDER — SODIUM CHLORIDE 0.9 % (FLUSH) 0.9 %
5-40 SYRINGE (ML) INJECTION PRN
Status: DISCONTINUED | OUTPATIENT
Start: 2023-11-09 | End: 2023-11-20 | Stop reason: HOSPADM

## 2023-11-09 RX ORDER — POTASSIUM CHLORIDE 20 MEQ/1
20 TABLET, EXTENDED RELEASE ORAL 2 TIMES DAILY
Status: DISCONTINUED | OUTPATIENT
Start: 2023-11-09 | End: 2023-11-20 | Stop reason: HOSPADM

## 2023-11-09 RX ORDER — ONDANSETRON 4 MG/1
4 TABLET, ORALLY DISINTEGRATING ORAL EVERY 8 HOURS PRN
Status: DISCONTINUED | OUTPATIENT
Start: 2023-11-09 | End: 2023-11-20 | Stop reason: HOSPADM

## 2023-11-09 RX ORDER — MEMANTINE HYDROCHLORIDE 5 MG/1
10 TABLET ORAL 2 TIMES DAILY
Status: DISCONTINUED | OUTPATIENT
Start: 2023-11-09 | End: 2023-11-20 | Stop reason: HOSPADM

## 2023-11-09 RX ORDER — OXYCODONE HYDROCHLORIDE 5 MG/1
5 TABLET ORAL EVERY 6 HOURS PRN
Qty: 20 TABLET | Refills: 0 | Status: SHIPPED | OUTPATIENT
Start: 2023-11-09 | End: 2023-11-14

## 2023-11-09 RX ORDER — SODIUM CHLORIDE 9 MG/ML
INJECTION, SOLUTION INTRAVENOUS PRN
Status: DISCONTINUED | OUTPATIENT
Start: 2023-11-09 | End: 2023-11-20 | Stop reason: HOSPADM

## 2023-11-09 RX ORDER — FUROSEMIDE 20 MG/1
20 TABLET ORAL DAILY
Status: DISCONTINUED | OUTPATIENT
Start: 2023-11-09 | End: 2023-11-20 | Stop reason: HOSPADM

## 2023-11-09 RX ORDER — POTASSIUM CHLORIDE 20 MEQ/1
40 TABLET, EXTENDED RELEASE ORAL PRN
Status: DISCONTINUED | OUTPATIENT
Start: 2023-11-09 | End: 2023-11-20 | Stop reason: HOSPADM

## 2023-11-09 RX ADMIN — METOPROLOL TARTRATE 12.5 MG: 25 TABLET, FILM COATED ORAL at 09:15

## 2023-11-09 RX ADMIN — ATORVASTATIN CALCIUM 40 MG: 40 TABLET, FILM COATED ORAL at 09:15

## 2023-11-09 RX ADMIN — ENOXAPARIN SODIUM 40 MG: 100 INJECTION SUBCUTANEOUS at 09:15

## 2023-11-09 RX ADMIN — METOPROLOL TARTRATE 12.5 MG: 25 TABLET, FILM COATED ORAL at 21:47

## 2023-11-09 RX ADMIN — POTASSIUM CHLORIDE 20 MEQ: 1500 TABLET, EXTENDED RELEASE ORAL at 09:15

## 2023-11-09 RX ADMIN — ACETAMINOPHEN 650 MG: 325 TABLET ORAL at 11:15

## 2023-11-09 RX ADMIN — MEMANTINE 10 MG: 5 TABLET ORAL at 21:48

## 2023-11-09 RX ADMIN — ACETAMINOPHEN 650 MG: 325 TABLET ORAL at 21:48

## 2023-11-09 RX ADMIN — ACETAMINOPHEN 650 MG: 325 TABLET ORAL at 16:57

## 2023-11-09 RX ADMIN — MEMANTINE 10 MG: 5 TABLET ORAL at 09:15

## 2023-11-09 RX ADMIN — DONEPEZIL HYDROCHLORIDE 10 MG: 5 TABLET, FILM COATED ORAL at 21:48

## 2023-11-09 RX ADMIN — POTASSIUM CHLORIDE 20 MEQ: 1500 TABLET, EXTENDED RELEASE ORAL at 21:48

## 2023-11-09 RX ADMIN — Medication 10 ML: at 09:17

## 2023-11-09 RX ADMIN — Medication 10 ML: at 21:52

## 2023-11-09 RX ADMIN — FUROSEMIDE 20 MG: 20 TABLET ORAL at 09:14

## 2023-11-09 RX ADMIN — AMLODIPINE BESYLATE 10 MG: 5 TABLET ORAL at 09:15

## 2023-11-09 ASSESSMENT — PAIN SCALES - GENERAL
PAINLEVEL_OUTOF10: 0
PAINLEVEL_OUTOF10: 0

## 2023-11-09 ASSESSMENT — LIFESTYLE VARIABLES: SMOKING_STATUS: 0

## 2023-11-09 NOTE — PLAN OF CARE
Problem: ABCDS Injury Assessment  Goal: Absence of physical injury  11/9/2023 0821 by Vick Damon RN  Outcome: Progressing     Problem: Skin/Tissue Integrity  Goal: Absence of new skin breakdown  Description: 1. Monitor for areas of redness and/or skin breakdown  2. Assess vascular access sites hourly  3. Every 4-6 hours minimum:  Change oxygen saturation probe site  4. Every 4-6 hours:  If on nasal continuous positive airway pressure, respiratory therapy assess nares and determine need for appliance change or resting period.   11/9/2023 0821 by Vick Damon RN  Outcome: Progressing     Problem: Discharge Planning  Goal: Discharge to home or other facility with appropriate resources  11/9/2023 0821 by Vick Damon RN  Outcome: Progressing     Problem: Safety - Adult  Goal: Free from fall injury  Outcome: Progressing

## 2023-11-10 ENCOUNTER — APPOINTMENT (OUTPATIENT)
Dept: GENERAL RADIOLOGY | Age: 88
DRG: 522 | End: 2023-11-10
Payer: MEDICARE

## 2023-11-10 ENCOUNTER — ANESTHESIA (OUTPATIENT)
Dept: OPERATING ROOM | Age: 88
End: 2023-11-10
Payer: MEDICARE

## 2023-11-10 PROCEDURE — 2500000003 HC RX 250 WO HCPCS: Performed by: ORTHOPAEDIC SURGERY

## 2023-11-10 PROCEDURE — 3600000015 HC SURGERY LEVEL 5 ADDTL 15MIN: Performed by: ORTHOPAEDIC SURGERY

## 2023-11-10 PROCEDURE — 2580000003 HC RX 258: Performed by: INTERNAL MEDICINE

## 2023-11-10 PROCEDURE — 6370000000 HC RX 637 (ALT 250 FOR IP): Performed by: NURSE PRACTITIONER

## 2023-11-10 PROCEDURE — 1200000000 HC SEMI PRIVATE

## 2023-11-10 PROCEDURE — 3700000001 HC ADD 15 MINUTES (ANESTHESIA): Performed by: ORTHOPAEDIC SURGERY

## 2023-11-10 PROCEDURE — 0SRS01Z REPLACEMENT OF LEFT HIP JOINT, FEMORAL SURFACE WITH METAL SYNTHETIC SUBSTITUTE, OPEN APPROACH: ICD-10-PCS | Performed by: ORTHOPAEDIC SURGERY

## 2023-11-10 PROCEDURE — 2720000010 HC SURG SUPPLY STERILE: Performed by: ORTHOPAEDIC SURGERY

## 2023-11-10 PROCEDURE — 2580000003 HC RX 258: Performed by: NURSE PRACTITIONER

## 2023-11-10 PROCEDURE — 2580000003 HC RX 258: Performed by: ANESTHESIOLOGY

## 2023-11-10 PROCEDURE — 2500000003 HC RX 250 WO HCPCS: Performed by: NURSE ANESTHETIST, CERTIFIED REGISTERED

## 2023-11-10 PROCEDURE — P9045 ALBUMIN (HUMAN), 5%, 250 ML: HCPCS | Performed by: NURSE ANESTHETIST, CERTIFIED REGISTERED

## 2023-11-10 PROCEDURE — 72170 X-RAY EXAM OF PELVIS: CPT

## 2023-11-10 PROCEDURE — 7100000000 HC PACU RECOVERY - FIRST 15 MIN: Performed by: ORTHOPAEDIC SURGERY

## 2023-11-10 PROCEDURE — 3600000005 HC SURGERY LEVEL 5 BASE: Performed by: ORTHOPAEDIC SURGERY

## 2023-11-10 PROCEDURE — 6360000002 HC RX W HCPCS: Performed by: ORTHOPAEDIC SURGERY

## 2023-11-10 PROCEDURE — 6360000002 HC RX W HCPCS: Performed by: ANESTHESIOLOGY

## 2023-11-10 PROCEDURE — 6360000002 HC RX W HCPCS: Performed by: NURSE PRACTITIONER

## 2023-11-10 PROCEDURE — 6360000002 HC RX W HCPCS: Performed by: NURSE ANESTHETIST, CERTIFIED REGISTERED

## 2023-11-10 PROCEDURE — 2580000003 HC RX 258: Performed by: ORTHOPAEDIC SURGERY

## 2023-11-10 PROCEDURE — 2709999900 HC NON-CHARGEABLE SUPPLY: Performed by: ORTHOPAEDIC SURGERY

## 2023-11-10 PROCEDURE — C1776 JOINT DEVICE (IMPLANTABLE): HCPCS | Performed by: ORTHOPAEDIC SURGERY

## 2023-11-10 PROCEDURE — 3700000000 HC ANESTHESIA ATTENDED CARE: Performed by: ORTHOPAEDIC SURGERY

## 2023-11-10 PROCEDURE — 7100000001 HC PACU RECOVERY - ADDTL 15 MIN: Performed by: ORTHOPAEDIC SURGERY

## 2023-11-10 DEVICE — SELF CENTERING BI-POLAR HEAD 28MM ID 52MM OD
Type: IMPLANTABLE DEVICE | Site: HIP | Status: FUNCTIONAL
Brand: SELF CENTERING

## 2023-11-10 DEVICE — ARTICUL/EZE FEMORAL HEAD DIAMETER 28MM +5 12/14 TAPER
Type: IMPLANTABLE DEVICE | Site: HIP | Status: FUNCTIONAL
Brand: ARTICUL/EZE

## 2023-11-10 DEVICE — STEM FEM SZ 13 L155MM NK L40.3MM 48.5MM HI OFFSET 125DEG: Type: IMPLANTABLE DEVICE | Site: HIP | Status: FUNCTIONAL

## 2023-11-10 RX ORDER — SODIUM CHLORIDE 9 MG/ML
INJECTION, SOLUTION INTRAVENOUS CONTINUOUS
Status: DISCONTINUED | OUTPATIENT
Start: 2023-11-10 | End: 2023-11-11

## 2023-11-10 RX ORDER — ONDANSETRON 2 MG/ML
4 INJECTION INTRAMUSCULAR; INTRAVENOUS
Status: DISCONTINUED | OUTPATIENT
Start: 2023-11-10 | End: 2023-11-10 | Stop reason: HOSPADM

## 2023-11-10 RX ORDER — ONDANSETRON 2 MG/ML
INJECTION INTRAMUSCULAR; INTRAVENOUS PRN
Status: DISCONTINUED | OUTPATIENT
Start: 2023-11-10 | End: 2023-11-10 | Stop reason: SDUPTHER

## 2023-11-10 RX ORDER — LABETALOL HYDROCHLORIDE 5 MG/ML
5 INJECTION, SOLUTION INTRAVENOUS
Status: DISCONTINUED | OUTPATIENT
Start: 2023-11-10 | End: 2023-11-10 | Stop reason: HOSPADM

## 2023-11-10 RX ORDER — HYDROMORPHONE HYDROCHLORIDE 2 MG/ML
0.5 INJECTION, SOLUTION INTRAMUSCULAR; INTRAVENOUS; SUBCUTANEOUS EVERY 5 MIN PRN
Status: DISCONTINUED | OUTPATIENT
Start: 2023-11-10 | End: 2023-11-10 | Stop reason: HOSPADM

## 2023-11-10 RX ORDER — ENOXAPARIN SODIUM 100 MG/ML
40 INJECTION SUBCUTANEOUS DAILY
Status: DISCONTINUED | OUTPATIENT
Start: 2023-11-11 | End: 2023-11-20 | Stop reason: HOSPADM

## 2023-11-10 RX ORDER — SODIUM CHLORIDE 0.9 % (FLUSH) 0.9 %
5-40 SYRINGE (ML) INJECTION EVERY 12 HOURS SCHEDULED
Status: DISCONTINUED | OUTPATIENT
Start: 2023-11-10 | End: 2023-11-10 | Stop reason: HOSPADM

## 2023-11-10 RX ORDER — VANCOMYCIN HYDROCHLORIDE 1 G/20ML
INJECTION, POWDER, LYOPHILIZED, FOR SOLUTION INTRAVENOUS
Status: COMPLETED | OUTPATIENT
Start: 2023-11-10 | End: 2023-11-10

## 2023-11-10 RX ORDER — MAGNESIUM HYDROXIDE 1200 MG/15ML
LIQUID ORAL
Status: COMPLETED | OUTPATIENT
Start: 2023-11-10 | End: 2023-11-10

## 2023-11-10 RX ORDER — PROPOFOL 10 MG/ML
INJECTION, EMULSION INTRAVENOUS PRN
Status: DISCONTINUED | OUTPATIENT
Start: 2023-11-10 | End: 2023-11-10 | Stop reason: SDUPTHER

## 2023-11-10 RX ORDER — ESMOLOL HYDROCHLORIDE 10 MG/ML
INJECTION INTRAVENOUS PRN
Status: DISCONTINUED | OUTPATIENT
Start: 2023-11-10 | End: 2023-11-10 | Stop reason: SDUPTHER

## 2023-11-10 RX ORDER — HYDROMORPHONE HYDROCHLORIDE 2 MG/ML
0.25 INJECTION, SOLUTION INTRAMUSCULAR; INTRAVENOUS; SUBCUTANEOUS EVERY 5 MIN PRN
Status: DISCONTINUED | OUTPATIENT
Start: 2023-11-10 | End: 2023-11-10 | Stop reason: HOSPADM

## 2023-11-10 RX ORDER — SODIUM CHLORIDE 0.9 % (FLUSH) 0.9 %
5-40 SYRINGE (ML) INJECTION PRN
Status: DISCONTINUED | OUTPATIENT
Start: 2023-11-10 | End: 2023-11-10 | Stop reason: HOSPADM

## 2023-11-10 RX ORDER — FENTANYL CITRATE 50 UG/ML
INJECTION, SOLUTION INTRAMUSCULAR; INTRAVENOUS PRN
Status: DISCONTINUED | OUTPATIENT
Start: 2023-11-10 | End: 2023-11-10 | Stop reason: SDUPTHER

## 2023-11-10 RX ORDER — MAGNESIUM HYDROXIDE 1200 MG/15ML
LIQUID ORAL CONTINUOUS PRN
Status: COMPLETED | OUTPATIENT
Start: 2023-11-10 | End: 2023-11-10

## 2023-11-10 RX ORDER — TRANEXAMIC ACID 10 MG/ML
1000 INJECTION, SOLUTION INTRAVENOUS ONCE
Status: COMPLETED | OUTPATIENT
Start: 2023-11-10 | End: 2023-11-10

## 2023-11-10 RX ORDER — SODIUM CHLORIDE 9 MG/ML
INJECTION, SOLUTION INTRAVENOUS PRN
Status: DISCONTINUED | OUTPATIENT
Start: 2023-11-10 | End: 2023-11-10 | Stop reason: HOSPADM

## 2023-11-10 RX ORDER — DEXAMETHASONE SODIUM PHOSPHATE 4 MG/ML
INJECTION, SOLUTION INTRA-ARTICULAR; INTRALESIONAL; INTRAMUSCULAR; INTRAVENOUS; SOFT TISSUE PRN
Status: DISCONTINUED | OUTPATIENT
Start: 2023-11-10 | End: 2023-11-10 | Stop reason: SDUPTHER

## 2023-11-10 RX ORDER — ROCURONIUM BROMIDE 10 MG/ML
INJECTION, SOLUTION INTRAVENOUS PRN
Status: DISCONTINUED | OUTPATIENT
Start: 2023-11-10 | End: 2023-11-10 | Stop reason: SDUPTHER

## 2023-11-10 RX ORDER — ALBUMIN, HUMAN INJ 5% 5 %
SOLUTION INTRAVENOUS PRN
Status: DISCONTINUED | OUTPATIENT
Start: 2023-11-10 | End: 2023-11-10 | Stop reason: SDUPTHER

## 2023-11-10 RX ADMIN — SODIUM CHLORIDE: 9 INJECTION, SOLUTION INTRAVENOUS at 08:28

## 2023-11-10 RX ADMIN — SODIUM CHLORIDE: 9 INJECTION, SOLUTION INTRAVENOUS at 08:02

## 2023-11-10 RX ADMIN — FENTANYL CITRATE 25 MCG: 50 INJECTION, SOLUTION INTRAMUSCULAR; INTRAVENOUS at 08:40

## 2023-11-10 RX ADMIN — ONDANSETRON 4 MG: 2 INJECTION INTRAMUSCULAR; INTRAVENOUS at 08:50

## 2023-11-10 RX ADMIN — ROCURONIUM BROMIDE 10 MG: 10 INJECTION, SOLUTION INTRAVENOUS at 09:13

## 2023-11-10 RX ADMIN — TRANEXAMIC ACID 1000 MG: 10 INJECTION, SOLUTION INTRAVENOUS at 08:29

## 2023-11-10 RX ADMIN — ESMOLOL HYDROCHLORIDE 10 MG: 10 INJECTION, SOLUTION INTRAVENOUS at 08:45

## 2023-11-10 RX ADMIN — DEXAMETHASONE SODIUM PHOSPHATE 4 MG: 4 INJECTION, SOLUTION INTRAMUSCULAR; INTRAVENOUS at 08:50

## 2023-11-10 RX ADMIN — HYDROMORPHONE HYDROCHLORIDE 0.25 MG: 2 INJECTION, SOLUTION INTRAMUSCULAR; INTRAVENOUS; SUBCUTANEOUS at 10:48

## 2023-11-10 RX ADMIN — Medication 10 ML: at 07:33

## 2023-11-10 RX ADMIN — ROCURONIUM BROMIDE 40 MG: 10 INJECTION, SOLUTION INTRAVENOUS at 08:40

## 2023-11-10 RX ADMIN — CEFAZOLIN 2000 MG: 2 INJECTION, POWDER, FOR SOLUTION INTRAMUSCULAR; INTRAVENOUS at 17:09

## 2023-11-10 RX ADMIN — PROPOFOL 90 MG: 10 INJECTION, EMULSION INTRAVENOUS at 08:40

## 2023-11-10 RX ADMIN — FENTANYL CITRATE 25 MCG: 50 INJECTION, SOLUTION INTRAMUSCULAR; INTRAVENOUS at 09:15

## 2023-11-10 RX ADMIN — SUGAMMADEX 200 MG: 100 INJECTION, SOLUTION INTRAVENOUS at 09:50

## 2023-11-10 RX ADMIN — FENTANYL CITRATE 25 MCG: 50 INJECTION, SOLUTION INTRAMUSCULAR; INTRAVENOUS at 08:48

## 2023-11-10 RX ADMIN — SODIUM CHLORIDE: 9 INJECTION, SOLUTION INTRAVENOUS at 17:08

## 2023-11-10 RX ADMIN — DONEPEZIL HYDROCHLORIDE 10 MG: 5 TABLET, FILM COATED ORAL at 20:32

## 2023-11-10 RX ADMIN — FENTANYL CITRATE 25 MCG: 50 INJECTION, SOLUTION INTRAMUSCULAR; INTRAVENOUS at 09:56

## 2023-11-10 RX ADMIN — CEFAZOLIN 2000 MG: 2 INJECTION, POWDER, FOR SOLUTION INTRAMUSCULAR; INTRAVENOUS at 08:27

## 2023-11-10 RX ADMIN — ALBUMIN (HUMAN) 12.5 G: 12.5 INJECTION, SOLUTION INTRAVENOUS at 09:01

## 2023-11-10 RX ADMIN — HYDROMORPHONE HYDROCHLORIDE 0.25 MG: 2 INJECTION, SOLUTION INTRAMUSCULAR; INTRAVENOUS; SUBCUTANEOUS at 10:39

## 2023-11-10 RX ADMIN — MEMANTINE 10 MG: 5 TABLET ORAL at 20:31

## 2023-11-10 RX ADMIN — POTASSIUM CHLORIDE 20 MEQ: 1500 TABLET, EXTENDED RELEASE ORAL at 20:32

## 2023-11-10 RX ADMIN — ACETAMINOPHEN 650 MG: 325 TABLET ORAL at 20:32

## 2023-11-10 RX ADMIN — ACETAMINOPHEN 650 MG: 325 TABLET ORAL at 16:59

## 2023-11-10 ASSESSMENT — PAIN SCALES - GENERAL
PAINLEVEL_OUTOF10: 6
PAINLEVEL_OUTOF10: 5

## 2023-11-10 ASSESSMENT — PAIN DESCRIPTION - ORIENTATION: ORIENTATION: LEFT

## 2023-11-10 ASSESSMENT — PAIN DESCRIPTION - LOCATION: LOCATION: HIP

## 2023-11-10 ASSESSMENT — PAIN - FUNCTIONAL ASSESSMENT: PAIN_FUNCTIONAL_ASSESSMENT: NONE - DENIES PAIN

## 2023-11-10 NOTE — PLAN OF CARE
Problem: ABCDS Injury Assessment  Goal: Absence of physical injury  Outcome: Progressing     Problem: Skin/Tissue Integrity  Goal: Absence of new skin breakdown  Description: 1. Monitor for areas of redness and/or skin breakdown  2. Assess vascular access sites hourly  3. Every 4-6 hours minimum:  Change oxygen saturation probe site  4. Every 4-6 hours:  If on nasal continuous positive airway pressure, respiratory therapy assess nares and determine need for appliance change or resting period.   Outcome: Progressing     Problem: Discharge Planning  Goal: Discharge to home or other facility with appropriate resources  Outcome: Progressing     Problem: Safety - Adult  Goal: Free from fall injury  Outcome: Progressing     Problem: Chronic Conditions and Co-morbidities  Goal: Patient's chronic conditions and co-morbidity symptoms are monitored and maintained or improved  Outcome: Progressing

## 2023-11-10 NOTE — OP NOTE
Operative Note      Patient: Rob White  YOB: 1932  MRN: [id number]    Date of Procedure: 11/10/2023    Pre-Op Diagnosis Codes:     * Closed fracture of left hip, initial encounter ([address] ) [S72.002A]    Post-Op Diagnosis: Same       Procedure(s):  LEFT HIP HEMIARTHROPLASTY-DEPUY    Surgeon(s):  Ayan Peralta MD    Assistant:   * No surgical staff found *    Anesthesia: General    Estimated Blood Loss (mL): 745     Complications: None    Specimens:   * No specimens in log *    Implants:  * No implants in log *      Drains:   Urinary Catheter 11/09/23 Cruz (Active)   Catheter Indications Urinary retention (acute or chronic), continuous bladder irrigation or bladder outlet obstruction 11/10/23 0723   Urine Color Angelica 11/10/23 0445   Urine Appearance Cloudy 11/10/23 0445   Urine Odor Malodorous 11/10/23 0445   Collection Container Standard 11/10/23 0445   Securement Method Tape;Leg strap 11/10/23 0445   Catheter Care  Perineal wipes 11/10/23 0445   Catheter Best Practices  Drainage tube clipped to bed;Catheter secured to thigh; Tamper seal intact; Bag below bladder;Bag not on floor; Lack of dependent loop in tubing;Drainage bag less than half full 11/10/23 0445   Status Draining 11/10/23 0445   Output (mL) 300 mL 11/10/23 0445       Findings: mild comminution at the fracture site. Detailed Description of Procedure:   PATIENT NAME:        Rob White  YOB: 1932   MEDICAL RECORD# [id number]  SURGERY DATE:      11/10/2023  SURGEON:                Ayan Peralta MD      PREOPERATIVE DIAGNOSIS: left  displaced femoral neck fracture. POSTOPERATIVE DIAGNOSIS: left displaced femoral neck fracture. PROCEDURE: left hip bipolar hemiarthroplasty. ANESTHESIA: general.     IV FLUIDS: Crystalloid. ESTIMATED BLOOD LOSS: 300 ml. COMPLICATIONS: None. The patient tolerated the procedure quite well.      Components: [employer] Corail metal mid coat size 13 high

## 2023-11-10 NOTE — ANESTHESIA POSTPROCEDURE EVALUATION
Department of Anesthesiology  Postprocedure Note    Patient: Susie Saldana  MRN: [id number]  YOB: 1932  Date of evaluation: 11/10/2023      Procedure Summary     Date: 11/10/23 Room / Location: [address]    Anesthesia Start: 0830 Anesthesia Stop: 8815    Procedure: LEFT HIP HEMIARTHROPLASTY-DEPUY (Left: Hip) Diagnosis:       Closed fracture of left hip, initial encounter ([address])      (Closed fracture of left hip, initial encounter ([address]) Florian Snare)    Surgeons: eDnnys Pringle MD Responsible Provider: Cady Byrd MD    Anesthesia Type: general ASA Status: 3          Anesthesia Type: No value filed.     Stefania Phase I: Stefania Score: 8    Stefania Phase II:        Anesthesia Post Evaluation    Patient location during evaluation: PACU  Airway patency: patent  Nausea & Vomiting: no vomiting  Complications: no  Cardiovascular status: hemodynamically stable  Respiratory status: acceptable  Hydration status: euvolemic  Multimodal analgesia pain management approach

## 2023-11-10 NOTE — PLAN OF CARE
[address] [facility]  Plan of Care Note      LEFT hip hemiarthroplasty today    Plan:  - WBAT; postero-lateral hip precautions  - PT/OT  - Resume diet as tolerated  - Pain control (Rx for dc in chart)  - Ancef x 2 post-op  - PACU pelvis x ray  - Keep incision covered x 7 days then can leave open to air if no drainage.  - Dispo: per primary team once medically stable and dispo needs met. Follow-up wit Dr. Isaac Leader in 4 weeks.   [address] [facility], APRN - CNP 11/10/2023 9:38 AM

## 2023-11-11 LAB
ANION GAP SERPL CALCULATED.3IONS-SCNC: 14 MMOL/L (ref 3–16)
BASOPHILS # BLD: 0 K/UL (ref 0–0.2)
BASOPHILS NFR BLD: 0.3 %
BUN SERPL-MCNC: 16 MG/DL (ref 7–20)
CALCIUM SERPL-MCNC: 8 MG/DL (ref 8.3–10.6)
CHLORIDE SERPL-SCNC: 110 MMOL/L (ref 99–110)
CO2 SERPL-SCNC: 18 MMOL/L (ref 21–32)
CREAT SERPL-MCNC: 0.7 MG/DL (ref 0.8–1.3)
DEPRECATED RDW RBC AUTO: 16.1 % (ref 12.4–15.4)
EOSINOPHIL # BLD: 0.1 K/UL (ref 0–0.6)
EOSINOPHIL NFR BLD: 0.9 %
GFR SERPLBLD CREATININE-BSD FMLA CKD-EPI: >60 ML/MIN/{1.73_M2}
GLUCOSE SERPL-MCNC: 177 MG/DL (ref 70–99)
HCT VFR BLD AUTO: 32 % (ref 40.5–52.5)
HGB BLD-MCNC: 10.2 G/DL (ref 13.5–17.5)
LYMPHOCYTES # BLD: 0.5 K/UL (ref 1–5.1)
LYMPHOCYTES NFR BLD: 6.5 %
MCH RBC QN AUTO: 27.4 PG (ref 26–34)
MCHC RBC AUTO-ENTMCNC: 31.9 G/DL (ref 31–36)
MCV RBC AUTO: 85.7 FL (ref 80–100)
MONOCYTES # BLD: 0.5 K/UL (ref 0–1.3)
MONOCYTES NFR BLD: 7.3 %
NEUTROPHILS # BLD: 6.3 K/UL (ref 1.7–7.7)
NEUTROPHILS NFR BLD: 85 %
PLATELET # BLD AUTO: 126 K/UL (ref 135–450)
PMV BLD AUTO: 9.2 FL (ref 5–10.5)
POTASSIUM SERPL-SCNC: 3.8 MMOL/L (ref 3.5–5.1)
RBC # BLD AUTO: 3.73 M/UL (ref 4.2–5.9)
SODIUM SERPL-SCNC: 142 MMOL/L (ref 136–145)
WBC # BLD AUTO: 7.4 K/UL (ref 4–11)

## 2023-11-11 PROCEDURE — 51798 US URINE CAPACITY MEASURE: CPT

## 2023-11-11 PROCEDURE — 97530 THERAPEUTIC ACTIVITIES: CPT

## 2023-11-11 PROCEDURE — 85025 COMPLETE CBC W/AUTO DIFF WBC: CPT

## 2023-11-11 PROCEDURE — 6370000000 HC RX 637 (ALT 250 FOR IP): Performed by: NURSE PRACTITIONER

## 2023-11-11 PROCEDURE — 97116 GAIT TRAINING THERAPY: CPT

## 2023-11-11 PROCEDURE — 2580000003 HC RX 258: Performed by: NURSE PRACTITIONER

## 2023-11-11 PROCEDURE — 1200000000 HC SEMI PRIVATE

## 2023-11-11 PROCEDURE — 97535 SELF CARE MNGMENT TRAINING: CPT

## 2023-11-11 PROCEDURE — 80048 BASIC METABOLIC PNL TOTAL CA: CPT

## 2023-11-11 PROCEDURE — 6360000002 HC RX W HCPCS: Performed by: NURSE PRACTITIONER

## 2023-11-11 PROCEDURE — 97162 PT EVAL MOD COMPLEX 30 MIN: CPT

## 2023-11-11 PROCEDURE — 97165 OT EVAL LOW COMPLEX 30 MIN: CPT

## 2023-11-11 RX ADMIN — ACETAMINOPHEN 650 MG: 325 TABLET ORAL at 10:09

## 2023-11-11 RX ADMIN — Medication 10 ML: at 22:10

## 2023-11-11 RX ADMIN — ACETAMINOPHEN 650 MG: 325 TABLET ORAL at 22:07

## 2023-11-11 RX ADMIN — POTASSIUM CHLORIDE 20 MEQ: 1500 TABLET, EXTENDED RELEASE ORAL at 22:08

## 2023-11-11 RX ADMIN — MEMANTINE 10 MG: 5 TABLET ORAL at 22:08

## 2023-11-11 RX ADMIN — Medication 10 ML: at 10:11

## 2023-11-11 RX ADMIN — ENOXAPARIN SODIUM 40 MG: 100 INJECTION SUBCUTANEOUS at 10:08

## 2023-11-11 RX ADMIN — ACETAMINOPHEN 650 MG: 325 TABLET ORAL at 16:09

## 2023-11-11 RX ADMIN — DONEPEZIL HYDROCHLORIDE 10 MG: 5 TABLET, FILM COATED ORAL at 22:10

## 2023-11-11 RX ADMIN — METOPROLOL TARTRATE 12.5 MG: 25 TABLET, FILM COATED ORAL at 10:09

## 2023-11-11 RX ADMIN — POTASSIUM CHLORIDE 20 MEQ: 1500 TABLET, EXTENDED RELEASE ORAL at 10:09

## 2023-11-11 RX ADMIN — ATORVASTATIN CALCIUM 40 MG: 40 TABLET, FILM COATED ORAL at 10:09

## 2023-11-11 RX ADMIN — FUROSEMIDE 20 MG: 20 TABLET ORAL at 10:11

## 2023-11-11 RX ADMIN — CEFAZOLIN 2000 MG: 2 INJECTION, POWDER, FOR SOLUTION INTRAMUSCULAR; INTRAVENOUS at 00:37

## 2023-11-11 RX ADMIN — MEMANTINE 10 MG: 5 TABLET ORAL at 10:09

## 2023-11-11 RX ADMIN — POLYETHYLENE GLYCOL 3350 17 G: 17 POWDER, FOR SOLUTION ORAL at 16:09

## 2023-11-11 RX ADMIN — AMLODIPINE BESYLATE 10 MG: 5 TABLET ORAL at 10:09

## 2023-11-11 ASSESSMENT — PAIN SCALES - GENERAL
PAINLEVEL_OUTOF10: 0

## 2023-11-11 NOTE — PLAN OF CARE
Problem: ABCDS Injury Assessment  Goal: Absence of physical injury  11/10/2023 2212 by Janis Iglesias RN  Outcome: Progressing  11/10/2023 1625 by Kassi Abreu RN  Outcome: Progressing     Problem: Skin/Tissue Integrity  Goal: Absence of new skin breakdown  Description: 1. Monitor for areas of redness and/or skin breakdown  2. Assess vascular access sites hourly  3. Every 4-6 hours minimum:  Change oxygen saturation probe site  4. Every 4-6 hours:  If on nasal continuous positive airway pressure, respiratory therapy assess nares and determine need for appliance change or resting period.   11/10/2023 2212 by Janis Iglesias RN  Outcome: Progressing  11/10/2023 1625 by Kassi Abreu RN  Outcome: Progressing     Problem: Discharge Planning  Goal: Discharge to home or other facility with appropriate resources  11/10/2023 2212 by Janis Iglesias RN  Outcome: Progressing  11/10/2023 1625 by Kassi Abreu RN  Outcome: Progressing     Problem: Safety - Adult  Goal: Free from fall injury  11/10/2023 2212 by Janis Iglesias RN  Outcome: Progressing  11/10/2023 1625 by Kassi Abreu RN  Outcome: Progressing     Problem: Chronic Conditions and Co-morbidities  Goal: Patient's chronic conditions and co-morbidity symptoms are monitored and maintained or improved  11/10/2023 2212 by Janis Iglesias RN  Outcome: Progressing  11/10/2023 1625 by Kassi Abreu RN  Outcome: Progressing

## 2023-11-12 LAB
ANION GAP SERPL CALCULATED.3IONS-SCNC: 10 MMOL/L (ref 3–16)
BASOPHILS # BLD: 0.1 K/UL (ref 0–0.2)
BASOPHILS NFR BLD: 0.8 %
BUN SERPL-MCNC: 16 MG/DL (ref 7–20)
CALCIUM SERPL-MCNC: 8 MG/DL (ref 8.3–10.6)
CHLORIDE SERPL-SCNC: 111 MMOL/L (ref 99–110)
CO2 SERPL-SCNC: 21 MMOL/L (ref 21–32)
CREAT SERPL-MCNC: 0.7 MG/DL (ref 0.8–1.3)
DEPRECATED RDW RBC AUTO: 15.8 % (ref 12.4–15.4)
EOSINOPHIL # BLD: 0.2 K/UL (ref 0–0.6)
EOSINOPHIL NFR BLD: 2.4 %
GFR SERPLBLD CREATININE-BSD FMLA CKD-EPI: >60 ML/MIN/{1.73_M2}
GLUCOSE SERPL-MCNC: 102 MG/DL (ref 70–99)
HCT VFR BLD AUTO: 29.4 % (ref 40.5–52.5)
HGB BLD-MCNC: 9.5 G/DL (ref 13.5–17.5)
LYMPHOCYTES # BLD: 0.7 K/UL (ref 1–5.1)
LYMPHOCYTES NFR BLD: 9.6 %
MCH RBC QN AUTO: 27.4 PG (ref 26–34)
MCHC RBC AUTO-ENTMCNC: 32.2 G/DL (ref 31–36)
MCV RBC AUTO: 85.1 FL (ref 80–100)
MONOCYTES # BLD: 0.7 K/UL (ref 0–1.3)
MONOCYTES NFR BLD: 9.2 %
NEUTROPHILS # BLD: 6 K/UL (ref 1.7–7.7)
NEUTROPHILS NFR BLD: 78 %
PLATELET # BLD AUTO: 125 K/UL (ref 135–450)
PMV BLD AUTO: 9.1 FL (ref 5–10.5)
POTASSIUM SERPL-SCNC: 4.3 MMOL/L (ref 3.5–5.1)
RBC # BLD AUTO: 3.46 M/UL (ref 4.2–5.9)
SODIUM SERPL-SCNC: 142 MMOL/L (ref 136–145)
WBC # BLD AUTO: 7.7 K/UL (ref 4–11)

## 2023-11-12 PROCEDURE — 97530 THERAPEUTIC ACTIVITIES: CPT

## 2023-11-12 PROCEDURE — 51798 US URINE CAPACITY MEASURE: CPT

## 2023-11-12 PROCEDURE — 36415 COLL VENOUS BLD VENIPUNCTURE: CPT

## 2023-11-12 PROCEDURE — 97535 SELF CARE MNGMENT TRAINING: CPT

## 2023-11-12 PROCEDURE — 6370000000 HC RX 637 (ALT 250 FOR IP): Performed by: NURSE PRACTITIONER

## 2023-11-12 PROCEDURE — 1200000000 HC SEMI PRIVATE

## 2023-11-12 PROCEDURE — 85025 COMPLETE CBC W/AUTO DIFF WBC: CPT

## 2023-11-12 PROCEDURE — 6360000002 HC RX W HCPCS: Performed by: NURSE PRACTITIONER

## 2023-11-12 PROCEDURE — 2580000003 HC RX 258: Performed by: NURSE PRACTITIONER

## 2023-11-12 PROCEDURE — 80048 BASIC METABOLIC PNL TOTAL CA: CPT

## 2023-11-12 RX ADMIN — AMLODIPINE BESYLATE 10 MG: 5 TABLET ORAL at 09:02

## 2023-11-12 RX ADMIN — MEMANTINE 10 MG: 5 TABLET ORAL at 21:46

## 2023-11-12 RX ADMIN — ENOXAPARIN SODIUM 40 MG: 100 INJECTION SUBCUTANEOUS at 09:01

## 2023-11-12 RX ADMIN — POTASSIUM CHLORIDE 20 MEQ: 1500 TABLET, EXTENDED RELEASE ORAL at 21:46

## 2023-11-12 RX ADMIN — DONEPEZIL HYDROCHLORIDE 10 MG: 5 TABLET, FILM COATED ORAL at 21:46

## 2023-11-12 RX ADMIN — ATORVASTATIN CALCIUM 40 MG: 40 TABLET, FILM COATED ORAL at 09:02

## 2023-11-12 RX ADMIN — Medication 10 ML: at 09:10

## 2023-11-12 RX ADMIN — ACETAMINOPHEN 650 MG: 325 TABLET ORAL at 21:46

## 2023-11-12 RX ADMIN — Medication 10 ML: at 21:47

## 2023-11-12 RX ADMIN — POTASSIUM CHLORIDE 20 MEQ: 1500 TABLET, EXTENDED RELEASE ORAL at 09:02

## 2023-11-12 RX ADMIN — FUROSEMIDE 20 MG: 20 TABLET ORAL at 09:01

## 2023-11-12 RX ADMIN — ACETAMINOPHEN 650 MG: 325 TABLET ORAL at 15:57

## 2023-11-12 RX ADMIN — ACETAMINOPHEN 650 MG: 325 TABLET ORAL at 09:01

## 2023-11-12 RX ADMIN — METOPROLOL TARTRATE 12.5 MG: 25 TABLET, FILM COATED ORAL at 09:01

## 2023-11-12 RX ADMIN — MEMANTINE 10 MG: 5 TABLET ORAL at 09:01

## 2023-11-12 ASSESSMENT — PAIN SCALES - GENERAL
PAINLEVEL_OUTOF10: 0
PAINLEVEL_OUTOF10: 0

## 2023-11-13 LAB
ANION GAP SERPL CALCULATED.3IONS-SCNC: 11 MMOL/L (ref 3–16)
BASOPHILS # BLD: 0 K/UL (ref 0–0.2)
BASOPHILS NFR BLD: 0.6 %
BUN SERPL-MCNC: 13 MG/DL (ref 7–20)
CALCIUM SERPL-MCNC: 7.8 MG/DL (ref 8.3–10.6)
CHLORIDE SERPL-SCNC: 110 MMOL/L (ref 99–110)
CO2 SERPL-SCNC: 22 MMOL/L (ref 21–32)
CREAT SERPL-MCNC: 0.6 MG/DL (ref 0.8–1.3)
DEPRECATED RDW RBC AUTO: 15.7 % (ref 12.4–15.4)
EOSINOPHIL # BLD: 0.2 K/UL (ref 0–0.6)
EOSINOPHIL NFR BLD: 2.7 %
GFR SERPLBLD CREATININE-BSD FMLA CKD-EPI: >60 ML/MIN/{1.73_M2}
GLUCOSE SERPL-MCNC: 93 MG/DL (ref 70–99)
HCT VFR BLD AUTO: 29.6 % (ref 40.5–52.5)
HGB BLD-MCNC: 9.7 G/DL (ref 13.5–17.5)
LYMPHOCYTES # BLD: 0.8 K/UL (ref 1–5.1)
LYMPHOCYTES NFR BLD: 11.5 %
MCH RBC QN AUTO: 27.7 PG (ref 26–34)
MCHC RBC AUTO-ENTMCNC: 32.6 G/DL (ref 31–36)
MCV RBC AUTO: 85 FL (ref 80–100)
MONOCYTES # BLD: 0.6 K/UL (ref 0–1.3)
MONOCYTES NFR BLD: 8.9 %
NEUTROPHILS # BLD: 5.2 K/UL (ref 1.7–7.7)
NEUTROPHILS NFR BLD: 76.3 %
PLATELET # BLD AUTO: 148 K/UL (ref 135–450)
PMV BLD AUTO: 9.1 FL (ref 5–10.5)
POTASSIUM SERPL-SCNC: 4.1 MMOL/L (ref 3.5–5.1)
RBC # BLD AUTO: 3.49 M/UL (ref 4.2–5.9)
SODIUM SERPL-SCNC: 143 MMOL/L (ref 136–145)
WBC # BLD AUTO: 6.8 K/UL (ref 4–11)

## 2023-11-13 PROCEDURE — 51798 US URINE CAPACITY MEASURE: CPT

## 2023-11-13 PROCEDURE — 80048 BASIC METABOLIC PNL TOTAL CA: CPT

## 2023-11-13 PROCEDURE — APPNB45 APP NON BILLABLE 31-45 MINUTES: Performed by: NURSE PRACTITIONER

## 2023-11-13 PROCEDURE — 97116 GAIT TRAINING THERAPY: CPT

## 2023-11-13 PROCEDURE — 97535 SELF CARE MNGMENT TRAINING: CPT

## 2023-11-13 PROCEDURE — 36415 COLL VENOUS BLD VENIPUNCTURE: CPT

## 2023-11-13 PROCEDURE — 6370000000 HC RX 637 (ALT 250 FOR IP): Performed by: NURSE PRACTITIONER

## 2023-11-13 PROCEDURE — 2580000003 HC RX 258: Performed by: NURSE PRACTITIONER

## 2023-11-13 PROCEDURE — 6360000002 HC RX W HCPCS: Performed by: NURSE PRACTITIONER

## 2023-11-13 PROCEDURE — 85025 COMPLETE CBC W/AUTO DIFF WBC: CPT

## 2023-11-13 PROCEDURE — 99024 POSTOP FOLLOW-UP VISIT: CPT | Performed by: NURSE PRACTITIONER

## 2023-11-13 PROCEDURE — 1200000000 HC SEMI PRIVATE

## 2023-11-13 PROCEDURE — 97530 THERAPEUTIC ACTIVITIES: CPT

## 2023-11-13 RX ADMIN — Medication 10 ML: at 09:08

## 2023-11-13 RX ADMIN — FUROSEMIDE 20 MG: 20 TABLET ORAL at 09:04

## 2023-11-13 RX ADMIN — OXYCODONE 5 MG: 5 TABLET ORAL at 19:20

## 2023-11-13 RX ADMIN — DONEPEZIL HYDROCHLORIDE 10 MG: 5 TABLET, FILM COATED ORAL at 22:35

## 2023-11-13 RX ADMIN — METOPROLOL TARTRATE 12.5 MG: 25 TABLET, FILM COATED ORAL at 22:33

## 2023-11-13 RX ADMIN — MEMANTINE 10 MG: 5 TABLET ORAL at 22:30

## 2023-11-13 RX ADMIN — ENOXAPARIN SODIUM 40 MG: 100 INJECTION SUBCUTANEOUS at 09:02

## 2023-11-13 RX ADMIN — ACETAMINOPHEN 650 MG: 325 TABLET ORAL at 09:03

## 2023-11-13 RX ADMIN — ATORVASTATIN CALCIUM 40 MG: 40 TABLET, FILM COATED ORAL at 09:04

## 2023-11-13 RX ADMIN — POTASSIUM CHLORIDE 20 MEQ: 1500 TABLET, EXTENDED RELEASE ORAL at 22:31

## 2023-11-13 RX ADMIN — Medication 10 ML: at 22:33

## 2023-11-13 RX ADMIN — ACETAMINOPHEN 650 MG: 325 TABLET ORAL at 22:31

## 2023-11-13 RX ADMIN — POTASSIUM CHLORIDE 20 MEQ: 1500 TABLET, EXTENDED RELEASE ORAL at 09:04

## 2023-11-13 RX ADMIN — AMLODIPINE BESYLATE 10 MG: 5 TABLET ORAL at 09:10

## 2023-11-13 RX ADMIN — METOPROLOL TARTRATE 12.5 MG: 25 TABLET, FILM COATED ORAL at 09:04

## 2023-11-13 RX ADMIN — MEMANTINE 10 MG: 5 TABLET ORAL at 09:03

## 2023-11-13 RX ADMIN — ACETAMINOPHEN 650 MG: 325 TABLET ORAL at 17:16

## 2023-11-13 ASSESSMENT — PAIN - FUNCTIONAL ASSESSMENT
PAIN_FUNCTIONAL_ASSESSMENT: ACTIVITIES ARE NOT PREVENTED

## 2023-11-13 ASSESSMENT — PAIN SCALES - GENERAL
PAINLEVEL_OUTOF10: 0
PAINLEVEL_OUTOF10: 0
PAINLEVEL_OUTOF10: 2
PAINLEVEL_OUTOF10: 0
PAINLEVEL_OUTOF10: 0

## 2023-11-13 ASSESSMENT — PAIN DESCRIPTION - DESCRIPTORS
DESCRIPTORS: SORE
DESCRIPTORS: ACHING

## 2023-11-13 ASSESSMENT — PAIN DESCRIPTION - ORIENTATION
ORIENTATION: LEFT

## 2023-11-13 ASSESSMENT — PAIN DESCRIPTION - LOCATION
LOCATION: HIP

## 2023-11-13 NOTE — CARE COORDINATION
PreAuth for ARU initiated, [id number]. ARU will continue to follow progress and update discharge plan as needed.     Juan Munoz, BSN, .[phone]

## 2023-11-13 NOTE — DISCHARGE INSTR - COC
Continuity of Care Form    Patient Name: Ariela Ball   :  1932  MRN:  [id number]    Admit date:  2023  Discharge date:  2023    Code Status Order: Full Code   Advance Directives:   [address] Documentation       Date/Time Healthcare Directive Type of Healthcare Directive Copy in [address] Agent's Name Healthcare Agent's Phone Number    11/10/23 2164 Yes, patient has an advance directive for healthcare treatment Living will;Durable power of  for health care -- -- Víctor Lewis [phone]            Admitting Physician:  Jade Thomason MD  PCP: Harrison Valdes    Discharging Nurse: [facility] Unit/Room#: 6LK-0880/3960-60  Discharging Unit Phone Number: [phone]    Emergency Contact:   Extended Emergency Contact Information  Primary Emergency Contact: GriffinJody  Home Phone: [phone]  Relation: Child  Secondary Emergency Contact: henry shaw  Home Phone: [phone]  Relation: Other  Preferred language: English   needed?  No    Past Surgical History:  Past Surgical History:   Procedure Laterality Date    ABDOMEN SURGERY      COLONOSCOPY N/A 10/29/2019    COLONOSCOPY DIAGNOSTIC performed by Bassam Riddle MD at [facility] N/A 10/31/2019    COLONOSCOPY POLYPECTOMY SNARE/COLD BIOPSY performed by Bassam Riddle MD at [address] [place] [address] Left 11/10/2023    LEFT HIP HEMIARTHROPLASTY-DEPUY performed by Tamiko García MD at [address] [place] [address] Bilateral     knee    UPPER GASTROINTESTINAL ENDOSCOPY N/A 10/31/2019    EGD DIAGNOSTIC ONLY performed by Bassam Riddle MD at [address] [place] [address] ENDOSCOPY       Immunization History:   Immunization History   Administered Date(s) Administered    COVID-19, PFIZER PURPLE top, DILUTE for use, (age 15 y+), 30mcg/0.3mL 2021, 2021, 12/10/2021       Active Problems:  Patient Active Problem List   Diagnosis Code    Rectal bleeding

## 2023-11-14 PROCEDURE — 2580000003 HC RX 258: Performed by: NURSE PRACTITIONER

## 2023-11-14 PROCEDURE — 6370000000 HC RX 637 (ALT 250 FOR IP): Performed by: NURSE PRACTITIONER

## 2023-11-14 PROCEDURE — 97535 SELF CARE MNGMENT TRAINING: CPT

## 2023-11-14 PROCEDURE — 6360000002 HC RX W HCPCS: Performed by: NURSE PRACTITIONER

## 2023-11-14 PROCEDURE — 97530 THERAPEUTIC ACTIVITIES: CPT

## 2023-11-14 PROCEDURE — APPNB45 APP NON BILLABLE 31-45 MINUTES: Performed by: NURSE PRACTITIONER

## 2023-11-14 PROCEDURE — 1200000000 HC SEMI PRIVATE

## 2023-11-14 PROCEDURE — 97116 GAIT TRAINING THERAPY: CPT

## 2023-11-14 PROCEDURE — 99024 POSTOP FOLLOW-UP VISIT: CPT | Performed by: NURSE PRACTITIONER

## 2023-11-14 RX ADMIN — POTASSIUM CHLORIDE 20 MEQ: 1500 TABLET, EXTENDED RELEASE ORAL at 21:39

## 2023-11-14 RX ADMIN — AMLODIPINE BESYLATE 10 MG: 5 TABLET ORAL at 09:59

## 2023-11-14 RX ADMIN — METOPROLOL TARTRATE 12.5 MG: 25 TABLET, FILM COATED ORAL at 09:59

## 2023-11-14 RX ADMIN — MEMANTINE 10 MG: 5 TABLET ORAL at 09:59

## 2023-11-14 RX ADMIN — MEMANTINE 10 MG: 5 TABLET ORAL at 21:39

## 2023-11-14 RX ADMIN — Medication 10 ML: at 21:47

## 2023-11-14 RX ADMIN — Medication 10 ML: at 10:00

## 2023-11-14 RX ADMIN — DONEPEZIL HYDROCHLORIDE 10 MG: 5 TABLET, FILM COATED ORAL at 21:39

## 2023-11-14 RX ADMIN — POTASSIUM CHLORIDE 20 MEQ: 1500 TABLET, EXTENDED RELEASE ORAL at 09:59

## 2023-11-14 RX ADMIN — ACETAMINOPHEN 650 MG: 325 TABLET ORAL at 16:37

## 2023-11-14 RX ADMIN — FUROSEMIDE 20 MG: 20 TABLET ORAL at 09:59

## 2023-11-14 RX ADMIN — ACETAMINOPHEN 650 MG: 325 TABLET ORAL at 09:59

## 2023-11-14 RX ADMIN — METOPROLOL TARTRATE 12.5 MG: 25 TABLET, FILM COATED ORAL at 21:39

## 2023-11-14 RX ADMIN — ACETAMINOPHEN 650 MG: 325 TABLET ORAL at 21:39

## 2023-11-14 RX ADMIN — ATORVASTATIN CALCIUM 40 MG: 40 TABLET, FILM COATED ORAL at 09:59

## 2023-11-14 RX ADMIN — ENOXAPARIN SODIUM 40 MG: 100 INJECTION SUBCUTANEOUS at 09:59

## 2023-11-15 PROCEDURE — 97530 THERAPEUTIC ACTIVITIES: CPT

## 2023-11-15 PROCEDURE — 97535 SELF CARE MNGMENT TRAINING: CPT

## 2023-11-15 PROCEDURE — 97116 GAIT TRAINING THERAPY: CPT

## 2023-11-15 PROCEDURE — 6360000002 HC RX W HCPCS: Performed by: NURSE PRACTITIONER

## 2023-11-15 PROCEDURE — 2580000003 HC RX 258: Performed by: NURSE PRACTITIONER

## 2023-11-15 PROCEDURE — 6370000000 HC RX 637 (ALT 250 FOR IP): Performed by: NURSE PRACTITIONER

## 2023-11-15 PROCEDURE — 1200000000 HC SEMI PRIVATE

## 2023-11-15 RX ADMIN — DONEPEZIL HYDROCHLORIDE 10 MG: 5 TABLET, FILM COATED ORAL at 20:20

## 2023-11-15 RX ADMIN — Medication 10 ML: at 09:20

## 2023-11-15 RX ADMIN — METOPROLOL TARTRATE 12.5 MG: 25 TABLET, FILM COATED ORAL at 09:19

## 2023-11-15 RX ADMIN — POTASSIUM CHLORIDE 20 MEQ: 1500 TABLET, EXTENDED RELEASE ORAL at 20:20

## 2023-11-15 RX ADMIN — ACETAMINOPHEN 650 MG: 325 TABLET ORAL at 20:19

## 2023-11-15 RX ADMIN — ATORVASTATIN CALCIUM 40 MG: 40 TABLET, FILM COATED ORAL at 09:18

## 2023-11-15 RX ADMIN — ACETAMINOPHEN 650 MG: 325 TABLET ORAL at 09:18

## 2023-11-15 RX ADMIN — ENOXAPARIN SODIUM 40 MG: 100 INJECTION SUBCUTANEOUS at 09:18

## 2023-11-15 RX ADMIN — FUROSEMIDE 20 MG: 20 TABLET ORAL at 09:18

## 2023-11-15 RX ADMIN — ACETAMINOPHEN 650 MG: 325 TABLET ORAL at 14:19

## 2023-11-15 RX ADMIN — Medication 10 ML: at 20:21

## 2023-11-15 RX ADMIN — MEMANTINE 10 MG: 5 TABLET ORAL at 09:19

## 2023-11-15 RX ADMIN — MEMANTINE 10 MG: 5 TABLET ORAL at 20:18

## 2023-11-15 RX ADMIN — POTASSIUM CHLORIDE 20 MEQ: 1500 TABLET, EXTENDED RELEASE ORAL at 09:18

## 2023-11-15 RX ADMIN — METOPROLOL TARTRATE 12.5 MG: 25 TABLET, FILM COATED ORAL at 20:18

## 2023-11-15 RX ADMIN — AMLODIPINE BESYLATE 10 MG: 5 TABLET ORAL at 09:18

## 2023-11-15 ASSESSMENT — PAIN DESCRIPTION - ORIENTATION: ORIENTATION: LEFT

## 2023-11-15 ASSESSMENT — PAIN DESCRIPTION - LOCATION: LOCATION: HIP

## 2023-11-15 ASSESSMENT — PAIN SCALES - GENERAL: PAINLEVEL_OUTOF10: 7

## 2023-11-15 NOTE — PLAN OF CARE
Problem: ABCDS Injury Assessment  Goal: Absence of physical injury  11/15/2023 0916 by Mir Miramontes RN  Outcome: Progressing  11/15/2023 0456 by Maykel Ocasio RN  Outcome: Progressing     Problem: Skin/Tissue Integrity  Goal: Absence of new skin breakdown  Description: 1. Monitor for areas of redness and/or skin breakdown  2. Assess vascular access sites hourly  3. Every 4-6 hours minimum:  Change oxygen saturation probe site  4. Every 4-6 hours:  If on nasal continuous positive airway pressure, respiratory therapy assess nares and determine need for appliance change or resting period.   11/15/2023 0916 by Mir Miramontes RN  Outcome: Progressing  11/15/2023 0456 by Maykel Ocasio RN  Outcome: Progressing     Problem: Discharge Planning  Goal: Discharge to home or other facility with appropriate resources  11/15/2023 0916 by Mir Miramontes RN  Outcome: Progressing  Flowsheets (Taken 11/15/2023 0900)  Discharge to home or other facility with appropriate resources:   Identify barriers to discharge with patient and caregiver   Arrange for needed discharge resources and transportation as appropriate   Identify discharge learning needs (meds, wound care, etc)   Refer to discharge planning if patient needs post-hospital services based on physician order or complex needs related to functional status, cognitive ability or social support system  11/15/2023 0456 by Maykel Ocasio RN  Outcome: Progressing     Problem: Safety - Adult  Goal: Free from fall injury  11/15/2023 0916 by Mir Miramontes RN  Outcome: Progressing  11/15/2023 0456 by Maykel Ocasio RN  Outcome: Progressing     Problem: Chronic Conditions and Co-morbidities  Goal: Patient's chronic conditions and co-morbidity symptoms are monitored and maintained or improved  11/15/2023 0916 by Mir Miramontes RN  Outcome: Progressing  11/15/2023 0456 by Maykel Ocasio RN  Outcome: Progressing     Problem: Pain  Goal:

## 2023-11-15 NOTE — PLAN OF CARE
Problem: ABCDS Injury Assessment  Goal: Absence of physical injury  Outcome: Progressing     Problem: Skin/Tissue Integrity  Goal: Absence of new skin breakdown  Description: 1. Monitor for areas of redness and/or skin breakdown  2. Assess vascular access sites hourly  3. Every 4-6 hours minimum:  Change oxygen saturation probe site  4. Every 4-6 hours:  If on nasal continuous positive airway pressure, respiratory therapy assess nares and determine need for appliance change or resting period.   Outcome: Progressing     Problem: Discharge Planning  Goal: Discharge to home or other facility with appropriate resources  Outcome: Progressing     Problem: Safety - Adult  Goal: Free from fall injury  Outcome: Progressing     Problem: Chronic Conditions and Co-morbidities  Goal: Patient's chronic conditions and co-morbidity symptoms are monitored and maintained or improved  Outcome: Progressing     Problem: Pain  Goal: Verbalizes/displays adequate comfort level or baseline comfort level  Outcome: Progressing

## 2023-11-16 PROCEDURE — 6370000000 HC RX 637 (ALT 250 FOR IP): Performed by: NURSE PRACTITIONER

## 2023-11-16 PROCEDURE — 97535 SELF CARE MNGMENT TRAINING: CPT

## 2023-11-16 PROCEDURE — 97530 THERAPEUTIC ACTIVITIES: CPT

## 2023-11-16 PROCEDURE — 1200000000 HC SEMI PRIVATE

## 2023-11-16 PROCEDURE — 2580000003 HC RX 258: Performed by: NURSE PRACTITIONER

## 2023-11-16 PROCEDURE — 97116 GAIT TRAINING THERAPY: CPT

## 2023-11-16 PROCEDURE — 6360000002 HC RX W HCPCS: Performed by: NURSE PRACTITIONER

## 2023-11-16 RX ADMIN — OXYCODONE 5 MG: 5 TABLET ORAL at 07:39

## 2023-11-16 RX ADMIN — ACETAMINOPHEN 650 MG: 325 TABLET ORAL at 20:43

## 2023-11-16 RX ADMIN — AMLODIPINE BESYLATE 10 MG: 5 TABLET ORAL at 08:39

## 2023-11-16 RX ADMIN — FUROSEMIDE 20 MG: 20 TABLET ORAL at 08:39

## 2023-11-16 RX ADMIN — ACETAMINOPHEN 650 MG: 325 TABLET ORAL at 14:40

## 2023-11-16 RX ADMIN — METOPROLOL TARTRATE 12.5 MG: 25 TABLET, FILM COATED ORAL at 08:39

## 2023-11-16 RX ADMIN — METOPROLOL TARTRATE 12.5 MG: 25 TABLET, FILM COATED ORAL at 20:45

## 2023-11-16 RX ADMIN — MEMANTINE 10 MG: 5 TABLET ORAL at 08:39

## 2023-11-16 RX ADMIN — POTASSIUM CHLORIDE 20 MEQ: 1500 TABLET, EXTENDED RELEASE ORAL at 20:44

## 2023-11-16 RX ADMIN — POTASSIUM CHLORIDE 20 MEQ: 1500 TABLET, EXTENDED RELEASE ORAL at 08:39

## 2023-11-16 RX ADMIN — DONEPEZIL HYDROCHLORIDE 10 MG: 5 TABLET, FILM COATED ORAL at 20:43

## 2023-11-16 RX ADMIN — ACETAMINOPHEN 650 MG: 325 TABLET ORAL at 08:39

## 2023-11-16 RX ADMIN — ATORVASTATIN CALCIUM 40 MG: 40 TABLET, FILM COATED ORAL at 08:39

## 2023-11-16 RX ADMIN — Medication 10 ML: at 08:40

## 2023-11-16 RX ADMIN — MEMANTINE 10 MG: 5 TABLET ORAL at 20:45

## 2023-11-16 RX ADMIN — ENOXAPARIN SODIUM 40 MG: 100 INJECTION SUBCUTANEOUS at 08:39

## 2023-11-16 ASSESSMENT — PAIN DESCRIPTION - FREQUENCY: FREQUENCY: CONTINUOUS

## 2023-11-16 ASSESSMENT — PAIN DESCRIPTION - ORIENTATION
ORIENTATION: LEFT
ORIENTATION: LEFT

## 2023-11-16 ASSESSMENT — PAIN DESCRIPTION - ONSET: ONSET: ON-GOING

## 2023-11-16 ASSESSMENT — PAIN SCALES - GENERAL
PAINLEVEL_OUTOF10: 4
PAINLEVEL_OUTOF10: 4
PAINLEVEL_OUTOF10: 8

## 2023-11-16 ASSESSMENT — PAIN DESCRIPTION - LOCATION
LOCATION: HIP
LOCATION: HIP;LEG

## 2023-11-16 ASSESSMENT — PAIN DESCRIPTION - PAIN TYPE: TYPE: SURGICAL PAIN

## 2023-11-16 ASSESSMENT — PAIN DESCRIPTION - DESCRIPTORS: DESCRIPTORS: SORE

## 2023-11-16 NOTE — PLAN OF CARE
Problem: ABCDS Injury Assessment  Goal: Absence of physical injury  Outcome: Progressing     Problem: Skin/Tissue Integrity  Goal: Absence of new skin breakdown  Description: 1. Monitor for areas of redness and/or skin breakdown  2. Assess vascular access sites hourly  3. Every 4-6 hours minimum:  Change oxygen saturation probe site  4. Every 4-6 hours:  If on nasal continuous positive airway pressure, respiratory therapy assess nares and determine need for appliance change or resting period.   Outcome: Progressing     Problem: Discharge Planning  Goal: Discharge to home or other facility with appropriate resources  Outcome: Progressing  Flowsheets (Taken 11/15/2023 2015 by Anne Pena RN)  Discharge to home or other facility with appropriate resources: Identify barriers to discharge with patient and caregiver     Problem: Safety - Adult  Goal: Free from fall injury  Outcome: Progressing     Problem: Chronic Conditions and Co-morbidities  Goal: Patient's chronic conditions and co-morbidity symptoms are monitored and maintained or improved  Outcome: Progressing  Flowsheets (Taken 11/15/2023 2015 by Anne Pena RN)  Care Plan - Patient's Chronic Conditions and Co-Morbidity Symptoms are Monitored and Maintained or Improved: Monitor and assess patient's chronic conditions and comorbid symptoms for stability, deterioration, or improvement     Problem: Pain  Goal: Verbalizes/displays adequate comfort level or baseline comfort level  Outcome: Progressing

## 2023-11-17 PROCEDURE — 1200000000 HC SEMI PRIVATE

## 2023-11-17 PROCEDURE — 97530 THERAPEUTIC ACTIVITIES: CPT

## 2023-11-17 PROCEDURE — 6370000000 HC RX 637 (ALT 250 FOR IP): Performed by: NURSE PRACTITIONER

## 2023-11-17 PROCEDURE — 97116 GAIT TRAINING THERAPY: CPT

## 2023-11-17 PROCEDURE — 97535 SELF CARE MNGMENT TRAINING: CPT

## 2023-11-17 PROCEDURE — 6360000002 HC RX W HCPCS: Performed by: NURSE PRACTITIONER

## 2023-11-17 RX ADMIN — OXYCODONE 5 MG: 5 TABLET ORAL at 15:11

## 2023-11-17 RX ADMIN — AMLODIPINE BESYLATE 10 MG: 5 TABLET ORAL at 09:12

## 2023-11-17 RX ADMIN — MEMANTINE 10 MG: 5 TABLET ORAL at 09:12

## 2023-11-17 RX ADMIN — METOPROLOL TARTRATE 12.5 MG: 25 TABLET, FILM COATED ORAL at 09:12

## 2023-11-17 RX ADMIN — MEMANTINE 10 MG: 5 TABLET ORAL at 21:10

## 2023-11-17 RX ADMIN — ACETAMINOPHEN 650 MG: 325 TABLET ORAL at 21:10

## 2023-11-17 RX ADMIN — ENOXAPARIN SODIUM 40 MG: 100 INJECTION SUBCUTANEOUS at 09:12

## 2023-11-17 RX ADMIN — DONEPEZIL HYDROCHLORIDE 10 MG: 5 TABLET, FILM COATED ORAL at 21:11

## 2023-11-17 RX ADMIN — ACETAMINOPHEN 650 MG: 325 TABLET ORAL at 09:12

## 2023-11-17 RX ADMIN — POTASSIUM CHLORIDE 20 MEQ: 1500 TABLET, EXTENDED RELEASE ORAL at 21:10

## 2023-11-17 RX ADMIN — FUROSEMIDE 20 MG: 20 TABLET ORAL at 09:12

## 2023-11-17 RX ADMIN — METOPROLOL TARTRATE 12.5 MG: 25 TABLET, FILM COATED ORAL at 21:10

## 2023-11-17 RX ADMIN — POTASSIUM CHLORIDE 20 MEQ: 1500 TABLET, EXTENDED RELEASE ORAL at 09:12

## 2023-11-17 RX ADMIN — ACETAMINOPHEN 650 MG: 325 TABLET ORAL at 15:14

## 2023-11-17 RX ADMIN — ATORVASTATIN CALCIUM 40 MG: 40 TABLET, FILM COATED ORAL at 09:12

## 2023-11-17 ASSESSMENT — PAIN - FUNCTIONAL ASSESSMENT: PAIN_FUNCTIONAL_ASSESSMENT: ACTIVITIES ARE NOT PREVENTED

## 2023-11-17 ASSESSMENT — PAIN DESCRIPTION - LOCATION: LOCATION: HIP

## 2023-11-17 ASSESSMENT — PAIN DESCRIPTION - ORIENTATION: ORIENTATION: LEFT

## 2023-11-17 ASSESSMENT — PAIN DESCRIPTION - DESCRIPTORS: DESCRIPTORS: ACHING;DISCOMFORT

## 2023-11-17 ASSESSMENT — PAIN SCALES - GENERAL: PAINLEVEL_OUTOF10: 7

## 2023-11-17 NOTE — PLAN OF CARE
Problem: ABCDS Injury Assessment  Goal: Absence of physical injury  11/17/2023 0858 by Saima Hernandez RN  Outcome: Progressing  11/16/2023 2329 by Melia Shepherd RN  Outcome: Progressing     Problem: Skin/Tissue Integrity  Goal: Absence of new skin breakdown  Description: 1. Monitor for areas of redness and/or skin breakdown  2. Assess vascular access sites hourly  3. Every 4-6 hours minimum:  Change oxygen saturation probe site  4. Every 4-6 hours:  If on nasal continuous positive airway pressure, respiratory therapy assess nares and determine need for appliance change or resting period.   11/17/2023 0858 by Saima Hernandez RN  Outcome: Progressing  11/16/2023 2329 by Melia Shepherd RN  Outcome: Progressing     Problem: Discharge Planning  Goal: Discharge to home or other facility with appropriate resources  11/17/2023 0858 by Saima Hernandez RN  Outcome: Progressing  11/16/2023 2329 by Melia Shepherd RN  Outcome: Progressing     Problem: Safety - Adult  Goal: Free from fall injury  11/17/2023 0858 by Saima Hernandez RN  Outcome: Progressing  11/16/2023 2329 by Melia Shepherd RN  Outcome: Progressing     Problem: Chronic Conditions and Co-morbidities  Goal: Patient's chronic conditions and co-morbidity symptoms are monitored and maintained or improved  11/17/2023 0858 by Saima Hernandez RN  Outcome: Progressing  11/16/2023 2329 by Melia Shepherd RN  Outcome: Progressing     Problem: Pain  Goal: Verbalizes/displays adequate comfort level or baseline comfort level  11/17/2023 0858 by Saima Hernandez RN  Outcome: Progressing  11/16/2023 2329 by Melia Shepherd RN  Outcome: Progressing

## 2023-11-17 NOTE — PLAN OF CARE
Problem: ABCDS Injury Assessment  Goal: Absence of physical injury  11/17/2023 0916 by Miguel A Jain RN  Outcome: Progressing  11/17/2023 0858 by Miguel A Jain RN  Outcome: Progressing  11/16/2023 2329 by Len Car RN  Outcome: Progressing     Problem: Skin/Tissue Integrity  Goal: Absence of new skin breakdown  Description: 1. Monitor for areas of redness and/or skin breakdown  2. Assess vascular access sites hourly  3. Every 4-6 hours minimum:  Change oxygen saturation probe site  4. Every 4-6 hours:  If on nasal continuous positive airway pressure, respiratory therapy assess nares and determine need for appliance change or resting period.   11/17/2023 0916 by Miguel A Jain RN  Outcome: Progressing  11/17/2023 0858 by Miguel A Jain RN  Outcome: Progressing  11/16/2023 2329 by Len Car RN  Outcome: Progressing     Problem: Discharge Planning  Goal: Discharge to home or other facility with appropriate resources  11/17/2023 0916 by Miguel A Jain RN  Outcome: Progressing  11/17/2023 0858 by Miguel A Jain RN  Outcome: Progressing  11/16/2023 2329 by Len Car RN  Outcome: Progressing     Problem: Safety - Adult  Goal: Free from fall injury  11/17/2023 0916 by Miguel A Jain RN  Outcome: Progressing  11/17/2023 0858 by Miguel A Jain RN  Outcome: Progressing  11/16/2023 2329 by Len Car RN  Outcome: Progressing     Problem: Chronic Conditions and Co-morbidities  Goal: Patient's chronic conditions and co-morbidity symptoms are monitored and maintained or improved  11/17/2023 0916 by Miguel A Jain RN  Outcome: Progressing  11/17/2023 0858 by Miguel A Jain RN  Outcome: Progressing  11/16/2023 2329 by Len Car RN  Outcome: Progressing     Problem: Pain  Goal: Verbalizes/displays adequate comfort level or baseline comfort level  11/17/2023 0916 by Miguel A Jain RN  Outcome: Progressing  11/17/2023 0858 by Miguel A Jain RN  Outcome:

## 2023-11-18 PROCEDURE — 1200000000 HC SEMI PRIVATE

## 2023-11-18 PROCEDURE — 97535 SELF CARE MNGMENT TRAINING: CPT

## 2023-11-18 PROCEDURE — 6370000000 HC RX 637 (ALT 250 FOR IP): Performed by: NURSE PRACTITIONER

## 2023-11-18 PROCEDURE — 97530 THERAPEUTIC ACTIVITIES: CPT

## 2023-11-18 PROCEDURE — 6360000002 HC RX W HCPCS: Performed by: NURSE PRACTITIONER

## 2023-11-18 RX ADMIN — AMLODIPINE BESYLATE 10 MG: 5 TABLET ORAL at 09:07

## 2023-11-18 RX ADMIN — POTASSIUM CHLORIDE 20 MEQ: 1500 TABLET, EXTENDED RELEASE ORAL at 09:00

## 2023-11-18 RX ADMIN — METOPROLOL TARTRATE 12.5 MG: 25 TABLET, FILM COATED ORAL at 20:20

## 2023-11-18 RX ADMIN — METOPROLOL TARTRATE 12.5 MG: 25 TABLET, FILM COATED ORAL at 09:00

## 2023-11-18 RX ADMIN — ENOXAPARIN SODIUM 40 MG: 100 INJECTION SUBCUTANEOUS at 09:05

## 2023-11-18 RX ADMIN — FUROSEMIDE 20 MG: 20 TABLET ORAL at 09:08

## 2023-11-18 RX ADMIN — ACETAMINOPHEN 650 MG: 325 TABLET ORAL at 20:19

## 2023-11-18 RX ADMIN — ACETAMINOPHEN 650 MG: 325 TABLET ORAL at 15:32

## 2023-11-18 RX ADMIN — MEMANTINE 10 MG: 5 TABLET ORAL at 20:20

## 2023-11-18 RX ADMIN — POTASSIUM CHLORIDE 20 MEQ: 1500 TABLET, EXTENDED RELEASE ORAL at 20:20

## 2023-11-18 RX ADMIN — ACETAMINOPHEN 650 MG: 325 TABLET ORAL at 09:06

## 2023-11-18 RX ADMIN — MEMANTINE 10 MG: 5 TABLET ORAL at 09:00

## 2023-11-18 RX ADMIN — POTASSIUM CHLORIDE 40 MEQ: 1500 TABLET, EXTENDED RELEASE ORAL at 09:07

## 2023-11-18 RX ADMIN — ATORVASTATIN CALCIUM 40 MG: 40 TABLET, FILM COATED ORAL at 09:07

## 2023-11-18 RX ADMIN — DONEPEZIL HYDROCHLORIDE 10 MG: 5 TABLET, FILM COATED ORAL at 20:19

## 2023-11-18 ASSESSMENT — PAIN DESCRIPTION - FREQUENCY: FREQUENCY: INTERMITTENT

## 2023-11-18 ASSESSMENT — PAIN SCALES - GENERAL
PAINLEVEL_OUTOF10: 4
PAINLEVEL_OUTOF10: 0
PAINLEVEL_OUTOF10: 0

## 2023-11-18 ASSESSMENT — PAIN DESCRIPTION - DESCRIPTORS
DESCRIPTORS: ACHING
DESCRIPTORS: ACHING;DISCOMFORT

## 2023-11-18 ASSESSMENT — PAIN DESCRIPTION - LOCATION
LOCATION: HIP
LOCATION: HIP

## 2023-11-18 ASSESSMENT — PAIN SCALES - WONG BAKER: WONGBAKER_NUMERICALRESPONSE: 0

## 2023-11-18 ASSESSMENT — PAIN DESCRIPTION - PAIN TYPE: TYPE: SURGICAL PAIN

## 2023-11-18 ASSESSMENT — PAIN - FUNCTIONAL ASSESSMENT
PAIN_FUNCTIONAL_ASSESSMENT: ACTIVITIES ARE NOT PREVENTED
PAIN_FUNCTIONAL_ASSESSMENT: ACTIVITIES ARE NOT PREVENTED

## 2023-11-18 ASSESSMENT — PAIN DESCRIPTION - ONSET: ONSET: ON-GOING

## 2023-11-18 ASSESSMENT — PAIN DESCRIPTION - ORIENTATION
ORIENTATION: LEFT
ORIENTATION: LEFT

## 2023-11-18 NOTE — PLAN OF CARE
Problem: ABCDS Injury Assessment  Goal: Absence of physical injury  Outcome: Progressing     Problem: Skin/Tissue Integrity  Goal: Absence of new skin breakdown  Description: 1. Monitor for areas of redness and/or skin breakdown  2. Assess vascular access sites hourly  3. Every 4-6 hours minimum:  Change oxygen saturation probe site  4. Every 4-6 hours:  If on nasal continuous positive airway pressure, respiratory therapy assess nares and determine need for appliance change or resting period.   Outcome: Progressing     Problem: Discharge Planning  Goal: Discharge to home or other facility with appropriate resources  Outcome: Progressing     Problem: Safety - Adult  Goal: Free from fall injury  Outcome: Progressing     Problem: Chronic Conditions and Co-morbidities  Goal: Patient's chronic conditions and co-morbidity symptoms are monitored and maintained or improved  Outcome: Progressing     Problem: Pain  Goal: Verbalizes/displays adequate comfort level or baseline comfort level  Outcome: Progressing     Problem: Nutrition Deficit:  Goal: Optimize nutritional status  Outcome: Progressing

## 2023-11-18 NOTE — PLAN OF CARE
Problem: ABCDS Injury Assessment  Goal: Absence of physical injury  11/18/2023 1204 by Liliana Daniels RN  Outcome: Progressing  11/18/2023 0150 by Carina Boudreaux RN  Outcome: Progressing     Problem: Skin/Tissue Integrity  Goal: Absence of new skin breakdown  Description: 1. Monitor for areas of redness and/or skin breakdown  2. Assess vascular access sites hourly  3. Every 4-6 hours minimum:  Change oxygen saturation probe site  4. Every 4-6 hours:  If on nasal continuous positive airway pressure, respiratory therapy assess nares and determine need for appliance change or resting period.   11/18/2023 1204 by Liliana Daniels RN  Outcome: Progressing  11/18/2023 0150 by Carina Boudreaux RN  Outcome: Progressing     Problem: Discharge Planning  Goal: Discharge to home or other facility with appropriate resources  11/18/2023 1204 by Liliana Daniels RN  Outcome: Progressing  11/18/2023 0150 by Carina Boudreaux RN  Outcome: Progressing     Problem: Safety - Adult  Goal: Free from fall injury  11/18/2023 1204 by Liliana Daniels RN  Outcome: Progressing  11/18/2023 0150 by Carina Boudreaux RN  Outcome: Progressing     Problem: Chronic Conditions and Co-morbidities  Goal: Patient's chronic conditions and co-morbidity symptoms are monitored and maintained or improved  11/18/2023 1204 by Liliana Daniels RN  Outcome: Progressing  11/18/2023 0150 by Carina Boudreaux RN  Outcome: Progressing     Problem: Pain  Goal: Verbalizes/displays adequate comfort level or baseline comfort level  11/18/2023 1204 by Liliana Daniels RN  Outcome: Progressing  11/18/2023 0150 by Carina Boudreaux RN  Outcome: Progressing     Problem: Nutrition Deficit:  Goal: Optimize nutritional status  11/18/2023 1204 by Liliana Daniels RN  Outcome: Progressing  11/18/2023 0150 by Carina Boudreaux RN  Outcome: Progressing

## 2023-11-19 PROCEDURE — 6360000002 HC RX W HCPCS: Performed by: NURSE PRACTITIONER

## 2023-11-19 PROCEDURE — 97535 SELF CARE MNGMENT TRAINING: CPT

## 2023-11-19 PROCEDURE — 6370000000 HC RX 637 (ALT 250 FOR IP): Performed by: NURSE PRACTITIONER

## 2023-11-19 PROCEDURE — 1200000000 HC SEMI PRIVATE

## 2023-11-19 PROCEDURE — 97530 THERAPEUTIC ACTIVITIES: CPT

## 2023-11-19 RX ORDER — SODIUM CHLORIDE 0.9 % (FLUSH) 0.9 %
5-40 SYRINGE (ML) INJECTION PRN
OUTPATIENT
Start: 2023-11-19

## 2023-11-19 RX ORDER — DONEPEZIL HYDROCHLORIDE 5 MG/1
10 TABLET, FILM COATED ORAL NIGHTLY
OUTPATIENT
Start: 2023-11-19

## 2023-11-19 RX ORDER — POLYETHYLENE GLYCOL 3350 17 G/17G
17 POWDER, FOR SOLUTION ORAL DAILY PRN
OUTPATIENT
Start: 2023-11-19

## 2023-11-19 RX ORDER — AMLODIPINE BESYLATE 5 MG/1
10 TABLET ORAL DAILY
OUTPATIENT
Start: 2023-11-20

## 2023-11-19 RX ORDER — OXYCODONE HYDROCHLORIDE 5 MG/1
5 TABLET ORAL EVERY 4 HOURS PRN
OUTPATIENT
Start: 2023-11-19

## 2023-11-19 RX ORDER — ATORVASTATIN CALCIUM 40 MG/1
40 TABLET, FILM COATED ORAL DAILY
OUTPATIENT
Start: 2023-11-20

## 2023-11-19 RX ORDER — ONDANSETRON 2 MG/ML
4 INJECTION INTRAMUSCULAR; INTRAVENOUS EVERY 6 HOURS PRN
OUTPATIENT
Start: 2023-11-19

## 2023-11-19 RX ORDER — ACETAMINOPHEN 325 MG/1
650 TABLET ORAL EVERY 4 HOURS PRN
OUTPATIENT
Start: 2023-11-19

## 2023-11-19 RX ORDER — ENOXAPARIN SODIUM 100 MG/ML
40 INJECTION SUBCUTANEOUS DAILY
OUTPATIENT
Start: 2023-11-19

## 2023-11-19 RX ORDER — ONDANSETRON 4 MG/1
4 TABLET, ORALLY DISINTEGRATING ORAL EVERY 8 HOURS PRN
OUTPATIENT
Start: 2023-11-19

## 2023-11-19 RX ORDER — SODIUM CHLORIDE 0.9 % (FLUSH) 0.9 %
5-40 SYRINGE (ML) INJECTION EVERY 12 HOURS SCHEDULED
OUTPATIENT
Start: 2023-11-19

## 2023-11-19 RX ORDER — FUROSEMIDE 20 MG/1
20 TABLET ORAL DAILY
OUTPATIENT
Start: 2023-11-20

## 2023-11-19 RX ORDER — POTASSIUM CHLORIDE 20 MEQ/1
20 TABLET, EXTENDED RELEASE ORAL 2 TIMES DAILY
OUTPATIENT
Start: 2023-11-19

## 2023-11-19 RX ORDER — MEMANTINE HYDROCHLORIDE 5 MG/1
10 TABLET ORAL 2 TIMES DAILY
OUTPATIENT
Start: 2023-11-19

## 2023-11-19 RX ORDER — BISACODYL 5 MG/1
5 TABLET, DELAYED RELEASE ORAL DAILY
OUTPATIENT
Start: 2023-11-19

## 2023-11-19 RX ADMIN — MEMANTINE 10 MG: 5 TABLET ORAL at 20:18

## 2023-11-19 RX ADMIN — METOPROLOL TARTRATE 12.5 MG: 25 TABLET, FILM COATED ORAL at 20:18

## 2023-11-19 RX ADMIN — ATORVASTATIN CALCIUM 40 MG: 40 TABLET, FILM COATED ORAL at 10:08

## 2023-11-19 RX ADMIN — DONEPEZIL HYDROCHLORIDE 10 MG: 5 TABLET, FILM COATED ORAL at 20:18

## 2023-11-19 RX ADMIN — ACETAMINOPHEN 650 MG: 325 TABLET ORAL at 20:18

## 2023-11-19 RX ADMIN — ENOXAPARIN SODIUM 40 MG: 100 INJECTION SUBCUTANEOUS at 09:00

## 2023-11-19 RX ADMIN — POTASSIUM CHLORIDE 20 MEQ: 1500 TABLET, EXTENDED RELEASE ORAL at 20:18

## 2023-11-19 RX ADMIN — POTASSIUM CHLORIDE 20 MEQ: 1500 TABLET, EXTENDED RELEASE ORAL at 10:07

## 2023-11-19 RX ADMIN — OXYCODONE 5 MG: 5 TABLET ORAL at 06:08

## 2023-11-19 RX ADMIN — MEMANTINE 10 MG: 5 TABLET ORAL at 10:08

## 2023-11-19 RX ADMIN — ACETAMINOPHEN 650 MG: 325 TABLET ORAL at 10:02

## 2023-11-19 RX ADMIN — ACETAMINOPHEN 650 MG: 325 TABLET ORAL at 15:34

## 2023-11-19 ASSESSMENT — PAIN DESCRIPTION - PAIN TYPE
TYPE: SURGICAL PAIN

## 2023-11-19 ASSESSMENT — PAIN SCALES - WONG BAKER
WONGBAKER_NUMERICALRESPONSE: 0
WONGBAKER_NUMERICALRESPONSE: 0
WONGBAKER_NUMERICALRESPONSE: 2
WONGBAKER_NUMERICALRESPONSE: 0

## 2023-11-19 ASSESSMENT — PAIN DESCRIPTION - LOCATION
LOCATION: HIP

## 2023-11-19 ASSESSMENT — PAIN DESCRIPTION - FREQUENCY
FREQUENCY: INTERMITTENT
FREQUENCY: INTERMITTENT

## 2023-11-19 ASSESSMENT — PAIN SCALES - GENERAL
PAINLEVEL_OUTOF10: 3
PAINLEVEL_OUTOF10: 0
PAINLEVEL_OUTOF10: 2
PAINLEVEL_OUTOF10: 7
PAINLEVEL_OUTOF10: 0
PAINLEVEL_OUTOF10: 2
PAINLEVEL_OUTOF10: 3

## 2023-11-19 ASSESSMENT — PAIN DESCRIPTION - DESCRIPTORS
DESCRIPTORS: ACHING

## 2023-11-19 ASSESSMENT — PAIN DESCRIPTION - ORIENTATION
ORIENTATION: LEFT

## 2023-11-19 ASSESSMENT — PAIN DESCRIPTION - ONSET
ONSET: ON-GOING

## 2023-11-19 NOTE — PLAN OF CARE
Problem: ABCDS Injury Assessment  Goal: Absence of physical injury  Outcome: Progressing     Problem: Skin/Tissue Integrity  Goal: Absence of new skin breakdown  Description: 1. Monitor for areas of redness and/or skin breakdown  2. Assess vascular access sites hourly  3. Every 4-6 hours minimum:  Change oxygen saturation probe site  4. Every 4-6 hours:  If on nasal continuous positive airway pressure, respiratory therapy assess nares and determine need for appliance change or resting period.   Outcome: Progressing     Problem: Discharge Planning  Goal: Discharge to home or other facility with appropriate resources  Outcome: Progressing     Problem: Safety - Adult  Goal: Free from fall injury  Outcome: Progressing     Problem: Chronic Conditions and Co-morbidities  Goal: Patient's chronic conditions and co-morbidity symptoms are monitored and maintained or improved  Outcome: Progressing     Problem: Pain  Goal: Verbalizes/displays adequate comfort level or baseline comfort level  Outcome: Progressing  Flowsheets (Taken 11/19/2023 0296)  Verbalizes/displays adequate comfort level or baseline comfort level: Assess pain using appropriate pain scale     Problem: Nutrition Deficit:  Goal: Optimize nutritional status  Outcome: Progressing

## 2023-11-19 NOTE — CARE COORDINATION
Call from Brian Padilla  [phone] [facility] states the insurance denied the appeal.     Pt will have to receive care from another source.     Electronically signed by Luis Bauer RN on 11/19/2023 at 11:48 AM "I get bottles in order to get food after work"

## 2023-11-20 VITALS
TEMPERATURE: 98.1 F | HEART RATE: 67 BPM | HEIGHT: 72 IN | BODY MASS INDEX: 26.68 KG/M2 | OXYGEN SATURATION: 99 % | DIASTOLIC BLOOD PRESSURE: 66 MMHG | SYSTOLIC BLOOD PRESSURE: 115 MMHG | RESPIRATION RATE: 18 BRPM | WEIGHT: 197 LBS

## 2023-11-20 PROCEDURE — 97116 GAIT TRAINING THERAPY: CPT

## 2023-11-20 PROCEDURE — 97535 SELF CARE MNGMENT TRAINING: CPT

## 2023-11-20 PROCEDURE — 97110 THERAPEUTIC EXERCISES: CPT

## 2023-11-20 PROCEDURE — APPNB45 APP NON BILLABLE 31-45 MINUTES: Performed by: NURSE PRACTITIONER

## 2023-11-20 PROCEDURE — 99024 POSTOP FOLLOW-UP VISIT: CPT | Performed by: NURSE PRACTITIONER

## 2023-11-20 PROCEDURE — 6370000000 HC RX 637 (ALT 250 FOR IP): Performed by: NURSE PRACTITIONER

## 2023-11-20 PROCEDURE — 97530 THERAPEUTIC ACTIVITIES: CPT

## 2023-11-20 PROCEDURE — 6360000002 HC RX W HCPCS: Performed by: NURSE PRACTITIONER

## 2023-11-20 RX ADMIN — MEMANTINE 10 MG: 5 TABLET ORAL at 09:33

## 2023-11-20 RX ADMIN — ENOXAPARIN SODIUM 40 MG: 100 INJECTION SUBCUTANEOUS at 09:00

## 2023-11-20 RX ADMIN — ACETAMINOPHEN 650 MG: 325 TABLET ORAL at 09:34

## 2023-11-20 RX ADMIN — ACETAMINOPHEN 650 MG: 325 TABLET ORAL at 14:13

## 2023-11-20 RX ADMIN — METOPROLOL TARTRATE 12.5 MG: 25 TABLET, FILM COATED ORAL at 09:36

## 2023-11-20 RX ADMIN — POTASSIUM CHLORIDE 20 MEQ: 1500 TABLET, EXTENDED RELEASE ORAL at 09:34

## 2023-11-20 RX ADMIN — ATORVASTATIN CALCIUM 40 MG: 40 TABLET, FILM COATED ORAL at 09:34

## 2023-11-20 RX ADMIN — AMLODIPINE BESYLATE 10 MG: 5 TABLET ORAL at 09:33

## 2023-11-20 RX ADMIN — FUROSEMIDE 20 MG: 20 TABLET ORAL at 09:34

## 2023-11-20 ASSESSMENT — PAIN SCALES - WONG BAKER
WONGBAKER_NUMERICALRESPONSE: 0

## 2023-11-20 ASSESSMENT — PAIN DESCRIPTION - PAIN TYPE: TYPE: SURGICAL PAIN

## 2023-11-20 ASSESSMENT — PAIN SCALES - GENERAL
PAINLEVEL_OUTOF10: 0

## 2023-11-20 ASSESSMENT — PAIN DESCRIPTION - ORIENTATION
ORIENTATION: LEFT
ORIENTATION: LEFT

## 2023-11-20 ASSESSMENT — PAIN DESCRIPTION - LOCATION
LOCATION: HIP
LOCATION: HIP

## 2023-11-20 ASSESSMENT — PAIN DESCRIPTION - DESCRIPTORS
DESCRIPTORS: ACHING
DESCRIPTORS: ACHING

## 2023-11-20 ASSESSMENT — PAIN DESCRIPTION - ONSET: ONSET: ON-GOING

## 2023-11-20 ASSESSMENT — PAIN DESCRIPTION - FREQUENCY: FREQUENCY: INTERMITTENT

## 2023-11-20 NOTE — CARE COORDINATION
Discharge Planning Note:    Met with the patient and family. An approved SNF list was reviewed and the following referral was placed at the request of the family:    - [facility] ID: [id number]    - Status: PENDING    Will continue to follow.     ADAM RosasN RN    [facility]  Phone: [phone]

## 2023-11-20 NOTE — CARE COORDINATION
Discharge Plan:     Patient discharged to: [facility]  [address], [address] [facility]    SW/DC Planner faxed, [address] and [address] to: [phone]    Narcotic Prescriptions faxed were: yes    RN: will call report to: [phone]    Medical Transport with: [facility] [phone]     time: 1800    Family advised of discharge?: yes    HENS Submitted?:  yes    All discharge needs met per case management.     ADAM HuertaN RN    [facility]  Phone: [phone]

## 2023-11-20 NOTE — PLAN OF CARE
Problem: ABCDS Injury Assessment  Goal: Absence of physical injury  Outcome: Progressing     Problem: Skin/Tissue Integrity  Goal: Absence of new skin breakdown  Description: 1. Monitor for areas of redness and/or skin breakdown  2. Assess vascular access sites hourly  3. Every 4-6 hours minimum:  Change oxygen saturation probe site  4. Every 4-6 hours:  If on nasal continuous positive airway pressure, respiratory therapy assess nares and determine need for appliance change or resting period.   Outcome: Progressing     Problem: Discharge Planning  Goal: Discharge to home or other facility with appropriate resources  Outcome: Progressing     Problem: Safety - Adult  Goal: Free from fall injury  Outcome: Progressing     Problem: Chronic Conditions and Co-morbidities  Goal: Patient's chronic conditions and co-morbidity symptoms are monitored and maintained or improved  Outcome: Progressing     Problem: Pain  Goal: Verbalizes/displays adequate comfort level or baseline comfort level  Outcome: Progressing  Flowsheets (Taken 11/20/2023 2286)  Verbalizes/displays adequate comfort level or baseline comfort level: Assess pain using appropriate pain scale     Problem: Nutrition Deficit:  Goal: Optimize nutritional status  Outcome: Progressing

## 2023-11-27 ENCOUNTER — TELEPHONE (OUTPATIENT)
Dept: ORTHOPEDIC SURGERY | Age: 88
End: 2023-11-27

## 2023-11-27 NOTE — TELEPHONE ENCOUNTER
General Question     Subject: REQ A HOSPITAL BED   Patient and /or Facility Request: Bimal Griffin  Contact Number: [phone]    PATIENT CALLED IN TO SEE IF THE PATIENT CAN GET MEDICAL HOSPITAL BED..      PLEASE ADVISE

## 2023-11-27 NOTE — TELEPHONE ENCOUNTER
General Question     Subject: L HIP QUESTIONS   Patient and /or Facility Request: Bimal Colindres  Contact Number: [phone]    PATIENT DAUGHTER CALLED IN TO SEE IF SHE CAN SPEAK RO SOMEONE IN THE OFFICE ABOUT PATIENT OTHER DAUGHTER SHLOMO COLINDRES CAN GET AN WORK NOTE FOR HER JOB FROM 11-22-23 TO 12-2-23 FOR HELPING HER FATHER AFTER SX..     PATIENT HAS BANDAGE COMING OFF HIS L HIP...     THEY ARE SPEAKING TO  THIS WEDNESDAY TO SEE IF HE ABLE TO LEAVE THE NURSING HOME THIS WEEK...    PLEASE ADVISE

## 2023-11-28 NOTE — TELEPHONE ENCOUNTER
Ok for hospital bed per Dr. Suarez.     I left a message informing the patient/daughter. I asked for them to call back to inform us if they would like to  the paper for this or have it faxed somewhere. If it needs to be faxed, please obtain a fax number.     Letter for hospital bed in patient chart.

## 2023-11-28 NOTE — TELEPHONE ENCOUNTER
Per Dr. Suarez, ok for work note for the daughter. I asked for her to call back to let us know if she would like this to be faxed or if she would like to  the note. If it needs faxed, please obtain a fax number.     Work note is in patients chart.

## 2023-12-01 ENCOUNTER — OFFICE VISIT (OUTPATIENT)
Dept: ORTHOPEDIC SURGERY | Age: 88
End: 2023-12-01

## 2023-12-01 VITALS — BODY MASS INDEX: 26.72 KG/M2 | HEIGHT: 72 IN

## 2023-12-01 DIAGNOSIS — Z96.649 S/P HIP HEMIARTHROPLASTY: Primary | ICD-10-CM

## 2023-12-29 ENCOUNTER — APPOINTMENT (OUTPATIENT)
Dept: GENERAL RADIOLOGY | Age: 88
End: 2023-12-29
Payer: MEDICARE

## 2023-12-29 ENCOUNTER — HOSPITAL ENCOUNTER (EMERGENCY)
Age: 88
Discharge: HOME | End: 2023-12-29
Attending: EMERGENCY MEDICINE
Payer: MEDICARE

## 2023-12-29 VITALS
RESPIRATION RATE: 18 BRPM | SYSTOLIC BLOOD PRESSURE: 142 MMHG | DIASTOLIC BLOOD PRESSURE: 82 MMHG | TEMPERATURE: 97.5 F | OXYGEN SATURATION: 97 % | HEART RATE: 78 BPM

## 2023-12-29 DIAGNOSIS — S76.012A HIP STRAIN, LEFT, INITIAL ENCOUNTER: Primary | ICD-10-CM

## 2023-12-29 PROCEDURE — 99283 EMERGENCY DEPT VISIT LOW MDM: CPT

## 2023-12-29 PROCEDURE — 73502 X-RAY EXAM HIP UNI 2-3 VIEWS: CPT

## 2023-12-29 PROCEDURE — 6370000000 HC RX 637 (ALT 250 FOR IP): Performed by: EMERGENCY MEDICINE

## 2023-12-29 RX ORDER — HYDROCODONE BITARTRATE AND ACETAMINOPHEN 5; 325 MG/1; MG/1
1 TABLET ORAL EVERY 8 HOURS PRN
Qty: 12 TABLET | Refills: 0 | Status: SHIPPED | OUTPATIENT
Start: 2023-12-29 | End: 2024-01-02

## 2023-12-29 RX ORDER — HYDROCODONE BITARTRATE AND ACETAMINOPHEN 5; 325 MG/1; MG/1
1 TABLET ORAL ONCE
Status: COMPLETED | OUTPATIENT
Start: 2023-12-29 | End: 2023-12-29

## 2023-12-29 RX ADMIN — HYDROCODONE BITARTRATE AND ACETAMINOPHEN 1 TABLET: 5; 325 TABLET ORAL at 05:14

## 2023-12-29 ASSESSMENT — PAIN SCALES - GENERAL
PAINLEVEL_OUTOF10: 4
PAINLEVEL_OUTOF10: 4

## 2023-12-29 ASSESSMENT — PAIN - FUNCTIONAL ASSESSMENT: PAIN_FUNCTIONAL_ASSESSMENT: 0-10

## 2023-12-29 NOTE — ED PROVIDER NOTES
Emergency Department Provider Note  Location: [facility] [address] [place] [address]  12/29/2023     Patient Identification  Kandi Stevenson is a 80 y.o. male    Chief Complaint  Fall (Pt fell in the bathroom and is having left hip pain. Fractured left hip a few weeks ago) and Hip Pain      HPI  (History provided by patient and daughter)  This is a 80 y.o. male with a PMH significant for prior left hip fracture s/p hip replacement presented today for left hip pain. Patient is supposed to walk with a walker but he did not use a walker when he got up to use the bathroom in the middle of the night. He lost his balance and fell. He landed on his left side. His only complaint is left hip pain. He denies pain anywhere else. He did not hit his head. No loss of consciousness. Patient was unable to get up without assistance. He denies numbness or tingling distally in the foot. No other complaints, modifying factors or associated symptoms. I have reviewed the following nursing documentation:  Allergies: Allergies   Allergen Reactions    Anesthetics, Sarah Beth     Aromatic Inhalants     Lidocaine     Nsaids Other (See Comments)     CKD    Turpentine Oil     Camph-Eucalypt-Men-Turp-Pet Rash       Past medical history:  has a past medical history of Atrial fibrillation ([address]), Cancer ([address]), Cellulitis, CHF (congestive heart failure) ([address]), Diverticulitis, Hypertension, and Morbid obesity ([address]). Past surgical history:  has a past surgical history that includes joint replacement (Bilateral); Abdomen surgery; Colonoscopy (N/A, 10/29/2019); Upper gastrointestinal endoscopy (N/A, 10/31/2019); Colonoscopy (N/A, 10/31/2019); and hip surgery (Left, 11/10/2023). Home medications:   Prior to Admission medications    Medication Sig Start Date End Date Taking?  Authorizing Provider   aspirin (ASPIRIN CHILDRENS) 81 MG chewable tablet Take 1 tablet by mouth in the morning and at bedtime 11/9/23 12/9/23  Cesar Dwyer

## 2024-01-09 ENCOUNTER — OFFICE VISIT (OUTPATIENT)
Dept: ORTHOPEDIC SURGERY | Age: 89
End: 2024-01-09

## 2024-01-09 VITALS — HEIGHT: 72 IN | BODY MASS INDEX: 26.68 KG/M2 | WEIGHT: 197 LBS

## 2024-01-09 DIAGNOSIS — M75.102 TEAR OF LEFT ROTATOR CUFF, UNSPECIFIED TEAR EXTENT, UNSPECIFIED WHETHER TRAUMATIC: ICD-10-CM

## 2024-01-09 DIAGNOSIS — M79.622 PAIN OF LEFT UPPER ARM: ICD-10-CM

## 2024-01-09 DIAGNOSIS — Z96.649 S/P HIP HEMIARTHROPLASTY: Primary | ICD-10-CM

## 2024-01-09 RX ORDER — BUPIVACAINE HYDROCHLORIDE 2.5 MG/ML
3 INJECTION, SOLUTION INFILTRATION; PERINEURAL ONCE
Status: COMPLETED | OUTPATIENT
Start: 2024-01-09 | End: 2024-01-09

## 2024-01-09 RX ORDER — TRIAMCINOLONE ACETONIDE 40 MG/ML
80 INJECTION, SUSPENSION INTRA-ARTICULAR; INTRAMUSCULAR ONCE
Status: COMPLETED | OUTPATIENT
Start: 2024-01-09 | End: 2024-01-09

## 2024-01-09 RX ADMIN — TRIAMCINOLONE ACETONIDE 80 MG: 40 INJECTION, SUSPENSION INTRA-ARTICULAR; INTRAMUSCULAR at 13:26

## 2024-01-09 RX ADMIN — BUPIVACAINE HYDROCHLORIDE 7.5 MG: 2.5 INJECTION, SOLUTION INFILTRATION; PERINEURAL at 13:26

## 2024-01-09 NOTE — PROGRESS NOTES
reviewed.  The humerus is high riding with a decreased acromiohumeral space.  There is evidence of glenohumeral arthritis    Impression: Status post left hip hemiarthroplasty #2 left shoulder rotator cuff arthropathy    Plan: The patient was explained the risks, benefits and alternatives to injection.  The patient understood the risks and benefits and agreed to proceed.  At this time we will inject the left shoulder under sterile conditions. After a Betadine prep of the shoulder, 3cc of 0.25% Marcaine and 2cc of 40 mg Kenalog were injected into the shoulder. The patient tolerated the injection rather well. The patient was instructed to follow up immediately for any signs of infection.  The patient will follow up with me in 6 week(s).The treatment plan was discussed in detail with the patient and includes activity modification and avoidance of repetitive overhead activities. Home exercises were explained to the patient.  We did explain to the daughter in the room as well as a daughter on the phone the exact details of the shoulder issue.  The patient is not a good surgical candidate for reverse total shoulder arthroplasty             Orders Placed This Encounter   Procedures    XR HUMERUS LEFT (MIN 2 VIEWS)     Rm 21. 2v     Standing Status:   Future     Number of Occurrences:   1     Standing Expiration Date:   2/9/2024     Order Specific Question:   Reason for exam:     Answer:   Arm Pain

## 2024-03-10 ENCOUNTER — APPOINTMENT (OUTPATIENT)
Dept: CT IMAGING | Age: 89
DRG: 253 | End: 2024-03-10
Payer: MEDICARE

## 2024-03-10 ENCOUNTER — APPOINTMENT (OUTPATIENT)
Dept: GENERAL RADIOLOGY | Age: 89
DRG: 253 | End: 2024-03-10
Payer: MEDICARE

## 2024-03-10 ENCOUNTER — ANESTHESIA (OUTPATIENT)
Dept: OPERATING ROOM | Age: 89
End: 2024-03-10
Payer: MEDICARE

## 2024-03-10 ENCOUNTER — ANESTHESIA EVENT (OUTPATIENT)
Dept: OPERATING ROOM | Age: 89
End: 2024-03-10
Payer: MEDICARE

## 2024-03-10 ENCOUNTER — HOSPITAL ENCOUNTER (INPATIENT)
Age: 89
LOS: 1 days | DRG: 253 | End: 2024-03-11
Attending: EMERGENCY MEDICINE | Admitting: INTERNAL MEDICINE
Payer: MEDICARE

## 2024-03-10 DIAGNOSIS — I99.8 ISCHEMIC FOOT: ICD-10-CM

## 2024-03-10 DIAGNOSIS — S32.10XA CLOSED FRACTURE OF SACRUM, UNSPECIFIED PORTION OF SACRUM, INITIAL ENCOUNTER (HCC): ICD-10-CM

## 2024-03-10 DIAGNOSIS — I48.91 ATRIAL FIBRILLATION WITH RVR (HCC): Primary | ICD-10-CM

## 2024-03-10 DIAGNOSIS — I70.209 SUPERFICIAL FEMORAL ARTERY OCCLUSION (HCC): ICD-10-CM
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
## 2024-03-10 LAB
ANION GAP SERPL CALCULATED.3IONS-SCNC: 18 MMOL/L (ref 3–16)
ANTI-XA UNFRAC HEPARIN: 0.84 IU/ML (ref 0.3–0.7)
APTT BLD: 27.8 SEC (ref 22.7–35.9)
APTT BLD: >180 SEC (ref 22.7–35.9)
BASE EXCESS BLDA CALC-SCNC: -11 MMOL/L (ref -3–3)
BASE EXCESS BLDA CALC-SCNC: -21 MMOL/L (ref -3–3)
BASOPHILS # BLD: 0 K/UL (ref 0–0.2)
BASOPHILS NFR BLD: 0.2 %
BUN SERPL-MCNC: 24 MG/DL (ref 7–20)
CA-I BLD-SCNC: 1.03 MMOL/L (ref 1.12–1.32)
CA-I BLD-SCNC: 1.04 MMOL/L (ref 1.12–1.32)
CALCIUM SERPL-MCNC: 9.5 MG/DL (ref 8.3–10.6)
CHLORIDE SERPL-SCNC: 107 MMOL/L (ref 99–110)
CK SERPL-CCNC: 570 U/L (ref 39–308)
CO2 BLDA-SCNC: 11 MMOL/L
CO2 BLDA-SCNC: 15 MMOL/L
CO2 SERPL-SCNC: 19 MMOL/L (ref 21–32)
CREAT SERPL-MCNC: 1 MG/DL (ref 0.8–1.3)
DEPRECATED RDW RBC AUTO: 20.4 % (ref 12.4–15.4)
DEPRECATED RDW RBC AUTO: 21.2 % (ref 12.4–15.4)
EKG ATRIAL RATE: 120 BPM
EKG DIAGNOSIS: NORMAL
EKG Q-T INTERVAL: 318 MS
EKG QRS DURATION: 114 MS
EKG QTC CALCULATION (BAZETT): 477 MS
EKG R AXIS: -46 DEGREES
EKG T AXIS: 98 DEGREES
EKG VENTRICULAR RATE: 135 BPM
EOSINOPHIL # BLD: 0 K/UL (ref 0–0.6)
EOSINOPHIL NFR BLD: 0 %
FIBRINOGEN PPP-MCNC: 121 MG/DL (ref 243–550)
GFR SERPLBLD CREATININE-BSD FMLA CKD-EPI: >60 ML/MIN/{1.73_M2}
GLUCOSE BLD-MCNC: 106 MG/DL (ref 70–99)
GLUCOSE BLD-MCNC: 111 MG/DL (ref 70–99)
GLUCOSE SERPL-MCNC: 129 MG/DL (ref 70–99)
HCO3 BLDA-SCNC: 13.9 MMOL/L (ref 21–29)
HCO3 BLDA-SCNC: 9.9 MMOL/L (ref 21–29)
HCT VFR BLD AUTO: 23 % (ref 40.5–52.5)
HCT VFR BLD AUTO: 27 % (ref 40.5–52.5)
HCT VFR BLD AUTO: 32.1 % (ref 40.5–52.5)
HCT VFR BLD AUTO: 38.3 % (ref 40.5–52.5)
HGB BLD CALC-MCNC: 7.9 GM/DL (ref 13.5–17.5)
HGB BLD CALC-MCNC: 9.1 GM/DL (ref 13.5–17.5)
HGB BLD-MCNC: 10.3 G/DL (ref 13.5–17.5)
HGB BLD-MCNC: 12.6 G/DL (ref 13.5–17.5)
INR PPP: 1.25 (ref 0.84–1.16)
INR PPP: 1.65 (ref 0.84–1.16)
LACTATE BLD-SCNC: 12.6 MMOL/L (ref 0.4–2)
LACTATE BLD-SCNC: 12.64 MMOL/L (ref 0.4–2)
LACTATE BLDV-SCNC: 3.1 MMOL/L (ref 0.4–2)
LYMPHOCYTES # BLD: 0.8 K/UL (ref 1–5.1)
LYMPHOCYTES NFR BLD: 7.6 %
MCH RBC QN AUTO: 28.2 PG (ref 26–34)
MCH RBC QN AUTO: 28.5 PG (ref 26–34)
MCHC RBC AUTO-ENTMCNC: 32.1 G/DL (ref 31–36)
MCHC RBC AUTO-ENTMCNC: 32.8 G/DL (ref 31–36)
MCV RBC AUTO: 85.8 FL (ref 80–100)
MCV RBC AUTO: 88.6 FL (ref 80–100)
MONOCYTES # BLD: 0.3 K/UL (ref 0–1.3)
MONOCYTES NFR BLD: 3.5 %
NEUTROPHILS # BLD: 8.8 K/UL (ref 1.7–7.7)
NEUTROPHILS NFR BLD: 88.7 %
PCO2 BLDA: 22.7 MM HG (ref 35–45)
PCO2 BLDA: 38 MM HG (ref 35–45)
PERFORMED ON: ABNORMAL
PERFORMED ON: ABNORMAL
PH BLDA: 7.03 [PH] (ref 7.35–7.45)
PH BLDA: 7.39 [PH] (ref 7.35–7.45)
PLATELET # BLD AUTO: 111 K/UL (ref 135–450)
PLATELET # BLD AUTO: 137 K/UL (ref 135–450)
PMV BLD AUTO: 8.2 FL (ref 5–10.5)
PMV BLD AUTO: 9 FL (ref 5–10.5)
PO2 BLDA: 133.6 MM HG (ref 75–108)
PO2 BLDA: 267.9 MM HG (ref 75–108)
POC SAMPLE TYPE: ABNORMAL
POC SAMPLE TYPE: ABNORMAL
POTASSIUM BLD-SCNC: 4 MMOL/L (ref 3.5–5.1)
POTASSIUM BLD-SCNC: 5.3 MMOL/L (ref 3.5–5.1)
POTASSIUM SERPL-SCNC: 4.2 MMOL/L (ref 3.5–5.1)
PROTHROMBIN TIME: 15.7 SEC (ref 11.5–14.8)
PROTHROMBIN TIME: 19.5 SEC (ref 11.5–14.8)
RBC # BLD AUTO: 3.63 M/UL (ref 4.2–5.9)
RBC # BLD AUTO: 4.47 M/UL (ref 4.2–5.9)
SAO2 % BLDA: 100 % (ref 93–100)
SAO2 % BLDA: 99 % (ref 93–100)
SODIUM BLD-SCNC: 149 MMOL/L (ref 136–145)
SODIUM BLD-SCNC: 151 MMOL/L (ref 136–145)
SODIUM SERPL-SCNC: 144 MMOL/L (ref 136–145)
TROPONIN, HIGH SENSITIVITY: 43 NG/L (ref 0–22)
TROPONIN, HIGH SENSITIVITY: 49 NG/L (ref 0–22)
WBC # BLD AUTO: 10.7 K/UL (ref 4–11)
WBC # BLD AUTO: 9.9 K/UL (ref 4–11)

## 2024-03-10 PROCEDURE — 93010 ELECTROCARDIOGRAM REPORT: CPT | Performed by: INTERNAL MEDICINE

## 2024-03-10 PROCEDURE — 2709999900 HC NON-CHARGEABLE SUPPLY: Performed by: SURGERY

## 2024-03-10 PROCEDURE — 83605 ASSAY OF LACTIC ACID: CPT

## 2024-03-10 PROCEDURE — C1889 IMPLANT/INSERT DEVICE, NOC: HCPCS | Performed by: SURGERY

## 2024-03-10 PROCEDURE — 96366 THER/PROPH/DIAG IV INF ADDON: CPT

## 2024-03-10 PROCEDURE — 3700000001 HC ADD 15 MINUTES (ANESTHESIA): Performed by: SURGERY

## 2024-03-10 PROCEDURE — 99285 EMERGENCY DEPT VISIT HI MDM: CPT

## 2024-03-10 PROCEDURE — 2580000003 HC RX 258: Performed by: PHYSICIAN ASSISTANT

## 2024-03-10 PROCEDURE — 6360000002 HC RX W HCPCS: Performed by: ANESTHESIOLOGY

## 2024-03-10 PROCEDURE — 2500000003 HC RX 250 WO HCPCS: Performed by: ANESTHESIOLOGY

## 2024-03-10 PROCEDURE — 72125 CT NECK SPINE W/O DYE: CPT

## 2024-03-10 PROCEDURE — 3600000014 HC SURGERY LEVEL 4 ADDTL 15MIN: Performed by: SURGERY

## 2024-03-10 PROCEDURE — 6370000000 HC RX 637 (ALT 250 FOR IP): Performed by: PHYSICIAN ASSISTANT

## 2024-03-10 PROCEDURE — 2580000003 HC RX 258: Performed by: SURGERY

## 2024-03-10 PROCEDURE — 85730 THROMBOPLASTIN TIME PARTIAL: CPT

## 2024-03-10 PROCEDURE — 70450 CT HEAD/BRAIN W/O DYE: CPT

## 2024-03-10 PROCEDURE — 7100000001 HC PACU RECOVERY - ADDTL 15 MIN: Performed by: SURGERY

## 2024-03-10 PROCEDURE — 71045 X-RAY EXAM CHEST 1 VIEW: CPT

## 2024-03-10 PROCEDURE — A4217 STERILE WATER/SALINE, 500 ML: HCPCS | Performed by: SURGERY

## 2024-03-10 PROCEDURE — 80048 BASIC METABOLIC PNL TOTAL CA: CPT

## 2024-03-10 PROCEDURE — C2628 CATHETER, OCCLUSION: HCPCS | Performed by: SURGERY

## 2024-03-10 PROCEDURE — 2000000000 HC ICU R&B

## 2024-03-10 PROCEDURE — 85027 COMPLETE CBC AUTOMATED: CPT

## 2024-03-10 PROCEDURE — 85025 COMPLETE CBC W/AUTO DIFF WBC: CPT

## 2024-03-10 PROCEDURE — 85610 PROTHROMBIN TIME: CPT

## 2024-03-10 PROCEDURE — 2500000003 HC RX 250 WO HCPCS: Performed by: INTERNAL MEDICINE

## 2024-03-10 PROCEDURE — 96375 TX/PRO/DX INJ NEW DRUG ADDON: CPT

## 2024-03-10 PROCEDURE — 05HY33Z INSERTION OF INFUSION DEVICE INTO UPPER VEIN, PERCUTANEOUS APPROACH: ICD-10-PCS | Performed by: INTERNAL MEDICINE

## 2024-03-10 PROCEDURE — 6360000002 HC RX W HCPCS: Performed by: SURGERY

## 2024-03-10 PROCEDURE — 75635 CT ANGIO ABDOMINAL ARTERIES: CPT

## 2024-03-10 PROCEDURE — 2580000003 HC RX 258: Performed by: EMERGENCY MEDICINE

## 2024-03-10 PROCEDURE — 93005 ELECTROCARDIOGRAM TRACING: CPT | Performed by: PHYSICIAN ASSISTANT

## 2024-03-10 PROCEDURE — 82947 ASSAY GLUCOSE BLOOD QUANT: CPT

## 2024-03-10 PROCEDURE — 04CM0ZZ EXTIRPATION OF MATTER FROM RIGHT POPLITEAL ARTERY, OPEN APPROACH: ICD-10-PCS | Performed by: SURGERY

## 2024-03-10 PROCEDURE — 6360000002 HC RX W HCPCS: Performed by: PHYSICIAN ASSISTANT

## 2024-03-10 PROCEDURE — 82330 ASSAY OF CALCIUM: CPT

## 2024-03-10 PROCEDURE — 84132 ASSAY OF SERUM POTASSIUM: CPT

## 2024-03-10 PROCEDURE — 7100000000 HC PACU RECOVERY - FIRST 15 MIN: Performed by: SURGERY

## 2024-03-10 PROCEDURE — 73502 X-RAY EXAM HIP UNI 2-3 VIEWS: CPT

## 2024-03-10 PROCEDURE — 96374 THER/PROPH/DIAG INJ IV PUSH: CPT

## 2024-03-10 PROCEDURE — 84484 ASSAY OF TROPONIN QUANT: CPT

## 2024-03-10 PROCEDURE — 85384 FIBRINOGEN ACTIVITY: CPT

## 2024-03-10 PROCEDURE — 2500000003 HC RX 250 WO HCPCS: Performed by: PHYSICIAN ASSISTANT

## 2024-03-10 PROCEDURE — 82803 BLOOD GASES ANY COMBINATION: CPT

## 2024-03-10 PROCEDURE — 82550 ASSAY OF CK (CPK): CPT

## 2024-03-10 PROCEDURE — 85014 HEMATOCRIT: CPT

## 2024-03-10 PROCEDURE — C1757 CATH, THROMBECTOMY/EMBOLECT: HCPCS | Performed by: SURGERY

## 2024-03-10 PROCEDURE — 2500000003 HC RX 250 WO HCPCS

## 2024-03-10 PROCEDURE — 6370000000 HC RX 637 (ALT 250 FOR IP): Performed by: SURGERY

## 2024-03-10 PROCEDURE — 73590 X-RAY EXAM OF LOWER LEG: CPT

## 2024-03-10 PROCEDURE — 96365 THER/PROPH/DIAG IV INF INIT: CPT

## 2024-03-10 PROCEDURE — 6360000004 HC RX CONTRAST MEDICATION: Performed by: EMERGENCY MEDICINE

## 2024-03-10 PROCEDURE — 04CK0ZZ EXTIRPATION OF MATTER FROM RIGHT FEMORAL ARTERY, OPEN APPROACH: ICD-10-PCS | Performed by: SURGERY

## 2024-03-10 PROCEDURE — 04CR0ZZ EXTIRPATION OF MATTER FROM RIGHT POSTERIOR TIBIAL ARTERY, OPEN APPROACH: ICD-10-PCS | Performed by: SURGERY

## 2024-03-10 PROCEDURE — 04CP0ZZ EXTIRPATION OF MATTER FROM RIGHT ANTERIOR TIBIAL ARTERY, OPEN APPROACH: ICD-10-PCS | Performed by: SURGERY

## 2024-03-10 PROCEDURE — 84295 ASSAY OF SERUM SODIUM: CPT

## 2024-03-10 PROCEDURE — 2580000003 HC RX 258: Performed by: INTERNAL MEDICINE

## 2024-03-10 PROCEDURE — 3600000004 HC SURGERY LEVEL 4 BASE: Performed by: SURGERY

## 2024-03-10 PROCEDURE — 0KNT0ZZ RELEASE LEFT LOWER LEG MUSCLE, OPEN APPROACH: ICD-10-PCS | Performed by: SURGERY

## 2024-03-10 PROCEDURE — 85520 HEPARIN ASSAY: CPT

## 2024-03-10 PROCEDURE — 3700000000 HC ANESTHESIA ATTENDED CARE: Performed by: SURGERY

## 2024-03-10 PROCEDURE — 2580000003 HC RX 258: Performed by: ANESTHESIOLOGY

## 2024-03-10 DEVICE — IMPLANTABLE DEVICE: Type: IMPLANTABLE DEVICE | Site: GROIN | Status: FUNCTIONAL

## 2024-03-10 DEVICE — CLIP LIG M BLU TI HRT SHP WIRE HORZ 6 CLIPS PER PK: Type: IMPLANTABLE DEVICE | Site: GROIN | Status: FUNCTIONAL

## 2024-03-10 RX ORDER — ROCURONIUM BROMIDE 10 MG/ML
INJECTION, SOLUTION INTRAVENOUS PRN
Status: DISCONTINUED | OUTPATIENT
Start: 2024-03-10 | End: 2024-03-10 | Stop reason: SDUPTHER

## 2024-03-10 RX ORDER — POTASSIUM CHLORIDE 20 MEQ/1
40 TABLET, EXTENDED RELEASE ORAL PRN
Status: DISCONTINUED | OUTPATIENT
Start: 2024-03-10 | End: 2024-03-11 | Stop reason: HOSPADM

## 2024-03-10 RX ORDER — CALCIUM CHLORIDE 100 MG/ML
INJECTION INTRAVENOUS; INTRAVENTRICULAR
Status: DISPENSED
Start: 2024-03-10 | End: 2024-03-11

## 2024-03-10 RX ORDER — MAGNESIUM SULFATE IN WATER 40 MG/ML
2000 INJECTION, SOLUTION INTRAVENOUS PRN
Status: DISCONTINUED | OUTPATIENT
Start: 2024-03-10 | End: 2024-03-11 | Stop reason: HOSPADM

## 2024-03-10 RX ORDER — HEPARIN SODIUM 1000 [USP'U]/ML
4000 INJECTION, SOLUTION INTRAVENOUS; SUBCUTANEOUS ONCE
Status: DISCONTINUED | OUTPATIENT
Start: 2024-03-10 | End: 2024-03-10

## 2024-03-10 RX ORDER — HYDROCODONE BITARTRATE AND ACETAMINOPHEN 5; 325 MG/1; MG/1
1 TABLET ORAL ONCE
Status: COMPLETED | OUTPATIENT
Start: 2024-03-10 | End: 2024-03-10

## 2024-03-10 RX ORDER — CEFAZOLIN 2 G/1
INJECTION, POWDER, FOR SOLUTION INTRAMUSCULAR; INTRAVENOUS
Status: COMPLETED
Start: 2024-03-10 | End: 2024-03-10

## 2024-03-10 RX ORDER — SODIUM CHLORIDE 9 MG/ML
INJECTION, SOLUTION INTRAVENOUS CONTINUOUS PRN
Status: DISCONTINUED | OUTPATIENT
Start: 2024-03-10 | End: 2024-03-10 | Stop reason: SDUPTHER

## 2024-03-10 RX ORDER — ONDANSETRON 2 MG/ML
4 INJECTION INTRAMUSCULAR; INTRAVENOUS EVERY 6 HOURS PRN
Status: DISCONTINUED | OUTPATIENT
Start: 2024-03-10 | End: 2024-03-11 | Stop reason: HOSPADM

## 2024-03-10 RX ORDER — POLYETHYLENE GLYCOL 3350 17 G/17G
17 POWDER, FOR SOLUTION ORAL DAILY PRN
Status: DISCONTINUED | OUTPATIENT
Start: 2024-03-10 | End: 2024-03-11 | Stop reason: HOSPADM

## 2024-03-10 RX ORDER — MAGNESIUM SULFATE HEPTAHYDRATE 500 MG/ML
INJECTION, SOLUTION INTRAMUSCULAR; INTRAVENOUS PRN
Status: DISCONTINUED | OUTPATIENT
Start: 2024-03-10 | End: 2024-03-10 | Stop reason: SDUPTHER

## 2024-03-10 RX ORDER — VASOPRESSIN 20 U/ML
INJECTION PARENTERAL PRN
Status: DISCONTINUED | OUTPATIENT
Start: 2024-03-10 | End: 2024-03-10 | Stop reason: SDUPTHER

## 2024-03-10 RX ORDER — SODIUM CHLORIDE 0.9 % (FLUSH) 0.9 %
5-40 SYRINGE (ML) INJECTION PRN
Status: DISCONTINUED | OUTPATIENT
Start: 2024-03-10 | End: 2024-03-10 | Stop reason: HOSPADM

## 2024-03-10 RX ORDER — ROCURONIUM BROMIDE 10 MG/ML
INJECTION, SOLUTION INTRAVENOUS
Status: DISCONTINUED
Start: 2024-03-10 | End: 2024-03-10 | Stop reason: WASHOUT

## 2024-03-10 RX ORDER — ETOMIDATE 2 MG/ML
INJECTION INTRAVENOUS
Status: DISCONTINUED
Start: 2024-03-10 | End: 2024-03-10 | Stop reason: WASHOUT

## 2024-03-10 RX ORDER — SODIUM CHLORIDE 9 MG/ML
INJECTION, SOLUTION INTRAVENOUS
Status: COMPLETED
Start: 2024-03-10 | End: 2024-03-10

## 2024-03-10 RX ORDER — ASPIRIN 81 MG/1
324 TABLET, CHEWABLE ORAL ONCE
Status: DISCONTINUED | OUTPATIENT
Start: 2024-03-10 | End: 2024-03-10 | Stop reason: HOSPADM

## 2024-03-10 RX ORDER — SODIUM CHLORIDE 0.9 % (FLUSH) 0.9 %
10 SYRINGE (ML) INJECTION PRN
Status: DISCONTINUED | OUTPATIENT
Start: 2024-03-10 | End: 2024-03-11 | Stop reason: HOSPADM

## 2024-03-10 RX ORDER — CALCIUM CHLORIDE 100 MG/ML
1000 INJECTION INTRAVENOUS; INTRAVENTRICULAR ONCE
Status: DISCONTINUED | OUTPATIENT
Start: 2024-03-11 | End: 2024-03-11 | Stop reason: HOSPADM

## 2024-03-10 RX ORDER — NOREPINEPHRINE BITARTRATE 0.06 MG/ML
INJECTION, SOLUTION INTRAVENOUS
Status: DISPENSED
Start: 2024-03-10 | End: 2024-03-11

## 2024-03-10 RX ORDER — DILTIAZEM HYDROCHLORIDE 5 MG/ML
10 INJECTION INTRAVENOUS ONCE
Status: COMPLETED | OUTPATIENT
Start: 2024-03-10 | End: 2024-03-10

## 2024-03-10 RX ORDER — DEXAMETHASONE SODIUM PHOSPHATE 4 MG/ML
INJECTION, SOLUTION INTRA-ARTICULAR; INTRALESIONAL; INTRAMUSCULAR; INTRAVENOUS; SOFT TISSUE PRN
Status: DISCONTINUED | OUTPATIENT
Start: 2024-03-10 | End: 2024-03-10 | Stop reason: SDUPTHER

## 2024-03-10 RX ORDER — HYDRALAZINE HYDROCHLORIDE 20 MG/ML
10 INJECTION INTRAMUSCULAR; INTRAVENOUS
Status: DISCONTINUED | OUTPATIENT
Start: 2024-03-10 | End: 2024-03-10 | Stop reason: HOSPADM

## 2024-03-10 RX ORDER — HEPARIN SODIUM 1000 [USP'U]/ML
2000 INJECTION, SOLUTION INTRAVENOUS; SUBCUTANEOUS PRN
Status: DISCONTINUED | OUTPATIENT
Start: 2024-03-10 | End: 2024-03-10

## 2024-03-10 RX ORDER — MEMANTINE HYDROCHLORIDE 5 MG/1
10 TABLET ORAL 2 TIMES DAILY
Status: DISCONTINUED | OUTPATIENT
Start: 2024-03-10 | End: 2024-03-11 | Stop reason: HOSPADM

## 2024-03-10 RX ORDER — NALOXONE HYDROCHLORIDE 0.4 MG/ML
INJECTION, SOLUTION INTRAMUSCULAR; INTRAVENOUS; SUBCUTANEOUS PRN
Status: DISCONTINUED | OUTPATIENT
Start: 2024-03-10 | End: 2024-03-10 | Stop reason: HOSPADM

## 2024-03-10 RX ORDER — NOREPINEPHRINE BITARTRATE 0.06 MG/ML
1-100 INJECTION, SOLUTION INTRAVENOUS CONTINUOUS
Status: DISCONTINUED | OUTPATIENT
Start: 2024-03-11 | End: 2024-03-11 | Stop reason: HOSPADM

## 2024-03-10 RX ORDER — EPINEPHRINE 0.1 MG/ML
INJECTION INTRAVENOUS
Status: DISPENSED
Start: 2024-03-10 | End: 2024-03-11

## 2024-03-10 RX ORDER — ATORVASTATIN CALCIUM 40 MG/1
40 TABLET, FILM COATED ORAL DAILY
Status: DISCONTINUED | OUTPATIENT
Start: 2024-03-11 | End: 2024-03-11 | Stop reason: HOSPADM

## 2024-03-10 RX ORDER — METOPROLOL TARTRATE 1 MG/ML
INJECTION, SOLUTION INTRAVENOUS PRN
Status: DISCONTINUED | OUTPATIENT
Start: 2024-03-10 | End: 2024-03-10 | Stop reason: SDUPTHER

## 2024-03-10 RX ORDER — LIDOCAINE HYDROCHLORIDE 10 MG/ML
5 INJECTION, SOLUTION EPIDURAL; INFILTRATION; INTRACAUDAL; PERINEURAL ONCE
Status: DISCONTINUED | OUTPATIENT
Start: 2024-03-10 | End: 2024-03-11 | Stop reason: HOSPADM

## 2024-03-10 RX ORDER — POTASSIUM CHLORIDE 7.45 MG/ML
10 INJECTION INTRAVENOUS PRN
Status: DISCONTINUED | OUTPATIENT
Start: 2024-03-10 | End: 2024-03-11 | Stop reason: HOSPADM

## 2024-03-10 RX ORDER — SODIUM CHLORIDE 0.9 % (FLUSH) 0.9 %
5-40 SYRINGE (ML) INJECTION PRN
Status: DISCONTINUED | OUTPATIENT
Start: 2024-03-10 | End: 2024-03-11 | Stop reason: HOSPADM

## 2024-03-10 RX ORDER — HEPARIN SODIUM 1000 [USP'U]/ML
4000 INJECTION, SOLUTION INTRAVENOUS; SUBCUTANEOUS PRN
Status: DISCONTINUED | OUTPATIENT
Start: 2024-03-10 | End: 2024-03-10

## 2024-03-10 RX ORDER — SODIUM CHLORIDE 0.9 % (FLUSH) 0.9 %
10 SYRINGE (ML) INJECTION EVERY 12 HOURS SCHEDULED
Status: DISCONTINUED | OUTPATIENT
Start: 2024-03-10 | End: 2024-03-11 | Stop reason: HOSPADM

## 2024-03-10 RX ORDER — FENTANYL CITRATE 50 UG/ML
25 INJECTION, SOLUTION INTRAMUSCULAR; INTRAVENOUS ONCE
Status: DISCONTINUED | OUTPATIENT
Start: 2024-03-10 | End: 2024-03-10

## 2024-03-10 RX ORDER — PHENYLEPHRINE HCL IN 0.9% NACL 1 MG/10 ML
SYRINGE (ML) INTRAVENOUS PRN
Status: DISCONTINUED | OUTPATIENT
Start: 2024-03-10 | End: 2024-03-10 | Stop reason: SDUPTHER

## 2024-03-10 RX ORDER — SODIUM CHLORIDE 9 MG/ML
25 INJECTION, SOLUTION INTRAVENOUS PRN
Status: DISCONTINUED | OUTPATIENT
Start: 2024-03-10 | End: 2024-03-11 | Stop reason: HOSPADM

## 2024-03-10 RX ORDER — CEFAZOLIN SODIUM 1 G/3ML
INJECTION, POWDER, FOR SOLUTION INTRAMUSCULAR; INTRAVENOUS PRN
Status: DISCONTINUED | OUTPATIENT
Start: 2024-03-10 | End: 2024-03-10 | Stop reason: SDUPTHER

## 2024-03-10 RX ORDER — EPHEDRINE SULFATE 50 MG/ML
INJECTION INTRAVENOUS PRN
Status: DISCONTINUED | OUTPATIENT
Start: 2024-03-10 | End: 2024-03-10 | Stop reason: SDUPTHER

## 2024-03-10 RX ORDER — ONDANSETRON 2 MG/ML
4 INJECTION INTRAMUSCULAR; INTRAVENOUS
Status: DISCONTINUED | OUTPATIENT
Start: 2024-03-10 | End: 2024-03-10 | Stop reason: HOSPADM

## 2024-03-10 RX ORDER — SODIUM CHLORIDE, SODIUM LACTATE, POTASSIUM CHLORIDE, CALCIUM CHLORIDE 600; 310; 30; 20 MG/100ML; MG/100ML; MG/100ML; MG/100ML
INJECTION, SOLUTION INTRAVENOUS ONCE
Status: COMPLETED | OUTPATIENT
Start: 2024-03-10 | End: 2024-03-10

## 2024-03-10 RX ORDER — SODIUM CHLORIDE 9 MG/ML
INJECTION, SOLUTION INTRAVENOUS CONTINUOUS
Status: DISCONTINUED | OUTPATIENT
Start: 2024-03-10 | End: 2024-03-11 | Stop reason: HOSPADM

## 2024-03-10 RX ORDER — SODIUM CHLORIDE 9 MG/ML
INJECTION, SOLUTION INTRAVENOUS PRN
Status: DISCONTINUED | OUTPATIENT
Start: 2024-03-10 | End: 2024-03-10 | Stop reason: HOSPADM

## 2024-03-10 RX ORDER — DILTIAZEM HCL IN NACL,ISO-OSM 125 MG/125
2.5-15 PLASTIC BAG, INJECTION (ML) INTRAVENOUS CONTINUOUS
Status: DISCONTINUED | OUTPATIENT
Start: 2024-03-10 | End: 2024-03-11 | Stop reason: HOSPADM

## 2024-03-10 RX ORDER — HEPARIN SODIUM AND DEXTROSE 10000; 5 [USP'U]/100ML; G/100ML
1000 INJECTION INTRAVENOUS CONTINUOUS
Status: DISCONTINUED | OUTPATIENT
Start: 2024-03-10 | End: 2024-03-10

## 2024-03-10 RX ORDER — SODIUM CHLORIDE 9 MG/ML
INJECTION, SOLUTION INTRAVENOUS PRN
Status: DISCONTINUED | OUTPATIENT
Start: 2024-03-10 | End: 2024-03-11 | Stop reason: HOSPADM

## 2024-03-10 RX ORDER — ACETAMINOPHEN 650 MG/1
650 SUPPOSITORY RECTAL EVERY 6 HOURS PRN
Status: DISCONTINUED | OUTPATIENT
Start: 2024-03-10 | End: 2024-03-11 | Stop reason: HOSPADM

## 2024-03-10 RX ORDER — SODIUM CHLORIDE 0.9 % (FLUSH) 0.9 %
5-40 SYRINGE (ML) INJECTION EVERY 12 HOURS SCHEDULED
Status: DISCONTINUED | OUTPATIENT
Start: 2024-03-10 | End: 2024-03-10 | Stop reason: HOSPADM

## 2024-03-10 RX ORDER — HEPARIN SODIUM 10000 [USP'U]/100ML
5-30 INJECTION, SOLUTION INTRAVENOUS CONTINUOUS
Status: DISCONTINUED | OUTPATIENT
Start: 2024-03-10 | End: 2024-03-10

## 2024-03-10 RX ORDER — SODIUM CHLORIDE 450 MG/100ML
INJECTION, SOLUTION INTRAVENOUS CONTINUOUS
Status: DISCONTINUED | OUTPATIENT
Start: 2024-03-11 | End: 2024-03-11 | Stop reason: HOSPADM

## 2024-03-10 RX ORDER — HEPARIN SODIUM 1000 [USP'U]/ML
INJECTION, SOLUTION INTRAVENOUS; SUBCUTANEOUS PRN
Status: DISCONTINUED | OUTPATIENT
Start: 2024-03-10 | End: 2024-03-10 | Stop reason: SDUPTHER

## 2024-03-10 RX ORDER — SODIUM CHLORIDE 0.9 % (FLUSH) 0.9 %
5-40 SYRINGE (ML) INJECTION EVERY 12 HOURS SCHEDULED
Status: DISCONTINUED | OUTPATIENT
Start: 2024-03-10 | End: 2024-03-11 | Stop reason: HOSPADM

## 2024-03-10 RX ORDER — ACETAMINOPHEN 325 MG/1
650 TABLET ORAL EVERY 6 HOURS PRN
Status: DISCONTINUED | OUTPATIENT
Start: 2024-03-10 | End: 2024-03-11 | Stop reason: HOSPADM

## 2024-03-10 RX ORDER — PROPOFOL 10 MG/ML
INJECTION, EMULSION INTRAVENOUS PRN
Status: DISCONTINUED | OUTPATIENT
Start: 2024-03-10 | End: 2024-03-10 | Stop reason: SDUPTHER

## 2024-03-10 RX ORDER — HYDROMORPHONE HYDROCHLORIDE 2 MG/ML
0.25 INJECTION, SOLUTION INTRAMUSCULAR; INTRAVENOUS; SUBCUTANEOUS EVERY 5 MIN PRN
Status: DISCONTINUED | OUTPATIENT
Start: 2024-03-10 | End: 2024-03-10 | Stop reason: HOSPADM

## 2024-03-10 RX ORDER — ONDANSETRON 4 MG/1
4 TABLET, ORALLY DISINTEGRATING ORAL EVERY 8 HOURS PRN
Status: DISCONTINUED | OUTPATIENT
Start: 2024-03-10 | End: 2024-03-11 | Stop reason: HOSPADM

## 2024-03-10 RX ORDER — 0.9 % SODIUM CHLORIDE 0.9 %
500 INTRAVENOUS SOLUTION INTRAVENOUS ONCE
Status: COMPLETED | OUTPATIENT
Start: 2024-03-10 | End: 2024-03-10

## 2024-03-10 RX ORDER — ONDANSETRON 2 MG/ML
INJECTION INTRAMUSCULAR; INTRAVENOUS PRN
Status: DISCONTINUED | OUTPATIENT
Start: 2024-03-10 | End: 2024-03-10 | Stop reason: SDUPTHER

## 2024-03-10 RX ORDER — LABETALOL HYDROCHLORIDE 5 MG/ML
10 INJECTION, SOLUTION INTRAVENOUS
Status: DISCONTINUED | OUTPATIENT
Start: 2024-03-10 | End: 2024-03-10 | Stop reason: HOSPADM

## 2024-03-10 RX ORDER — FENTANYL CITRATE 50 UG/ML
INJECTION, SOLUTION INTRAMUSCULAR; INTRAVENOUS PRN
Status: DISCONTINUED | OUTPATIENT
Start: 2024-03-10 | End: 2024-03-10 | Stop reason: SDUPTHER

## 2024-03-10 RX ADMIN — SODIUM CHLORIDE: 4.5 INJECTION, SOLUTION INTRAVENOUS at 23:38

## 2024-03-10 RX ADMIN — FENTANYL CITRATE 50 MCG: 50 INJECTION, SOLUTION INTRAMUSCULAR; INTRAVENOUS at 13:28

## 2024-03-10 RX ADMIN — Medication 200 MCG: at 13:09

## 2024-03-10 RX ADMIN — SODIUM BICARBONATE 50 MEQ: 84 INJECTION INTRAVENOUS at 21:28

## 2024-03-10 RX ADMIN — SODIUM CHLORIDE, POTASSIUM CHLORIDE, SODIUM LACTATE AND CALCIUM CHLORIDE: 600; 310; 30; 20 INJECTION, SOLUTION INTRAVENOUS at 20:40

## 2024-03-10 RX ADMIN — ROCURONIUM BROMIDE 50 MG: 10 INJECTION, SOLUTION INTRAVENOUS at 12:59

## 2024-03-10 RX ADMIN — PROPOFOL 100 MG: 10 INJECTION, EMULSION INTRAVENOUS at 12:59

## 2024-03-10 RX ADMIN — SODIUM CHLORIDE, PRESERVATIVE FREE 10 ML: 5 INJECTION INTRAVENOUS at 21:46

## 2024-03-10 RX ADMIN — DILTIAZEM HYDROCHLORIDE 5 MG/HR: 5 INJECTION, SOLUTION INTRAVENOUS at 08:35

## 2024-03-10 RX ADMIN — VASOPRESSIN 4 UNITS: 20 INJECTION INTRAVENOUS at 13:10

## 2024-03-10 RX ADMIN — Medication 200 MCG: at 13:01

## 2024-03-10 RX ADMIN — SODIUM CHLORIDE: 9 INJECTION, SOLUTION INTRAVENOUS at 12:47

## 2024-03-10 RX ADMIN — HYDROCODONE BITARTRATE AND ACETAMINOPHEN 1 TABLET: 5; 325 TABLET ORAL at 01:46

## 2024-03-10 RX ADMIN — FENTANYL CITRATE 50 MCG: 50 INJECTION, SOLUTION INTRAMUSCULAR; INTRAVENOUS at 12:59

## 2024-03-10 RX ADMIN — SODIUM CHLORIDE: 9 INJECTION, SOLUTION INTRAVENOUS at 20:43

## 2024-03-10 RX ADMIN — SODIUM CHLORIDE 500 ML: 9 INJECTION, SOLUTION INTRAVENOUS at 03:20

## 2024-03-10 RX ADMIN — ONDANSETRON 4 MG: 2 INJECTION INTRAMUSCULAR; INTRAVENOUS at 12:59

## 2024-03-10 RX ADMIN — IOPAMIDOL 120 ML: 755 INJECTION, SOLUTION INTRAVENOUS at 08:10

## 2024-03-10 RX ADMIN — SODIUM BICARBONATE 50 MEQ: 84 INJECTION INTRAVENOUS at 20:57

## 2024-03-10 RX ADMIN — Medication 5 MCG/MIN: at 23:34

## 2024-03-10 RX ADMIN — EPHEDRINE SULFATE 50 MG: 50 INJECTION, SOLUTION INTRAVENOUS at 13:05

## 2024-03-10 RX ADMIN — MAGNESIUM SULFATE HEPTAHYDRATE 2 G: 500 INJECTION, SOLUTION INTRAMUSCULAR; INTRAVENOUS at 13:25

## 2024-03-10 RX ADMIN — ONDANSETRON 4 MG: 2 INJECTION INTRAMUSCULAR; INTRAVENOUS at 03:21

## 2024-03-10 RX ADMIN — DEXAMETHASONE SODIUM PHOSPHATE 8 MG: 4 INJECTION, SOLUTION INTRAMUSCULAR; INTRAVENOUS at 12:59

## 2024-03-10 RX ADMIN — Medication 200 MCG: at 13:13

## 2024-03-10 RX ADMIN — Medication 50 MEQ: at 23:37

## 2024-03-10 RX ADMIN — DILTIAZEM HYDROCHLORIDE 5 MG/HR: 5 INJECTION, SOLUTION INTRAVENOUS at 15:36

## 2024-03-10 RX ADMIN — METOPROLOL TARTRATE 5 MG: 1 INJECTION, SOLUTION INTRAVENOUS at 13:27

## 2024-03-10 RX ADMIN — PHENYLEPHRINE HYDROCHLORIDE 25 MCG/MIN: 10 INJECTION INTRAVENOUS at 19:16

## 2024-03-10 RX ADMIN — VASOPRESSIN 2 UNITS: 20 INJECTION INTRAVENOUS at 14:47

## 2024-03-10 RX ADMIN — PROTAMINE SULFATE 20 MG: 10 INJECTION, SOLUTION INTRAVENOUS at 20:44

## 2024-03-10 RX ADMIN — HEPARIN SODIUM 5000 UNITS: 1000 INJECTION INTRAVENOUS; SUBCUTANEOUS at 13:34

## 2024-03-10 RX ADMIN — CEFAZOLIN 2 G: 1 INJECTION, POWDER, FOR SOLUTION INTRAVENOUS at 13:07

## 2024-03-10 RX ADMIN — DILTIAZEM HYDROCHLORIDE 10 MG: 5 INJECTION, SOLUTION INTRAVENOUS at 03:21

## 2024-03-10 RX ADMIN — SUGAMMADEX 200 MG: 100 INJECTION, SOLUTION INTRAVENOUS at 15:18

## 2024-03-10 RX ADMIN — Medication 200 MCG: at 12:58

## 2024-03-10 RX ADMIN — HEPARIN SODIUM 1000 UNITS/HR: 10000 INJECTION, SOLUTION INTRAVENOUS at 15:37

## 2024-03-10 RX ADMIN — Medication 50 MEQ: at 21:28

## 2024-03-10 RX ADMIN — ROCURONIUM BROMIDE 20 MG: 10 INJECTION, SOLUTION INTRAVENOUS at 13:27

## 2024-03-10 RX ADMIN — SODIUM BICARBONATE 50 MEQ: 84 INJECTION INTRAVENOUS at 23:37

## 2024-03-10 RX ADMIN — Medication 200 MCG: at 13:05

## 2024-03-10 RX ADMIN — SODIUM BICARBONATE: 84 INJECTION, SOLUTION INTRAVENOUS at 21:46

## 2024-03-10 RX ADMIN — SODIUM CHLORIDE: 9 INJECTION, SOLUTION INTRAVENOUS at 16:30

## 2024-03-10 ASSESSMENT — PAIN DESCRIPTION - DESCRIPTORS: DESCRIPTORS: ACHING

## 2024-03-10 ASSESSMENT — PAIN DESCRIPTION - LOCATION
LOCATION: HIP
LOCATION: HIP

## 2024-03-10 ASSESSMENT — PAIN SCALES - GENERAL
PAINLEVEL_OUTOF10: 0
PAINLEVEL_OUTOF10: 10
PAINLEVEL_OUTOF10: 9

## 2024-03-10 ASSESSMENT — PAIN DESCRIPTION - ORIENTATION
ORIENTATION: RIGHT
ORIENTATION: RIGHT

## 2024-03-10 ASSESSMENT — PAIN DESCRIPTION - PAIN TYPE: TYPE: ACUTE PAIN

## 2024-03-10 ASSESSMENT — PAIN - FUNCTIONAL ASSESSMENT: PAIN_FUNCTIONAL_ASSESSMENT: 0-10

## 2024-03-10 NOTE — CONSENT
Informed Refusal for Blood Component Transfusion Note    I have discussed with the son and daughter the rationale for blood component transfusion; its benefits in treating or preventing fatigue, organ damage, or death; and its risk which includes mild transfusion reactions, rare risk of blood borne infection, or more serious but rare reactions. I have discussed the alternatives to transfusion, including the risk and consequences of not receiving transfusion. The son and daughter had an opportunity to ask questions and had REFUSED to proceed with transfusion of  all blood products due to Hoahaoism reasons  .    Electronically signed by Sagar Valdez MD on 3/10/24 at 2:00 PM EDT

## 2024-03-10 NOTE — ED PROVIDER NOTES
I personally saw this patient in conjunction with the advanced practice provider.  Additionally, I took independent history and physical.  I agree with the history, assessment, and manage plan as documented in the chart except as otherwise noted.  Nursing notes reviewed and I agree except as otherwise noted.    Patient signed out to the oncoming physician, Dr. Perez pending test results      Labs Reviewed   CBC WITH AUTO DIFFERENTIAL - Abnormal; Notable for the following components:       Result Value    Hemoglobin 12.6 (*)     Hematocrit 38.3 (*)     RDW 20.4 (*)     Neutrophils Absolute 8.8 (*)     Lymphocytes Absolute 0.8 (*)     All other components within normal limits   BASIC METABOLIC PANEL - Abnormal; Notable for the following components:    CO2 19 (*)     Anion Gap 18 (*)     Glucose 129 (*)     BUN 24 (*)     All other components within normal limits   TROPONIN - Abnormal; Notable for the following components:    Troponin, High Sensitivity 49 (*)     All other components within normal limits   TROPONIN   PROTIME-INR   APTT        CT CERVICAL SPINE WO CONTRAST   Preliminary Result   No acute abnormality of the cervical spine.         CT HEAD WO CONTRAST   Preliminary Result   No acute intracranial abnormality.      Age related changes including chronic small vessel ischemic disease and   cerebral atrophy.         XR HIP 2-3 VW W PELVIS RIGHT   Final Result   Total left hip replacement which is unchanged.      Moderate osteoarthritic changes right hip and diffuse osteopenia with no   acute bony abnormality seen         XR TIBIA FIBULA RIGHT (2 VIEWS)   Final Result   Status post total right knee replacement in good position.      Diffuse osteopenia with no acute bony abnormality.         XR CHEST PORTABLE   Preliminary Result   1. No acute cardiopulmonary disease.         CTA ABDOMINAL AORTA W BILAT RUNOFF W CONTRAST    (Results Pending)        Medications   ondansetron (ZOFRAN) injection 4 mg (4 mg  IntraVENous Given 3/10/24 0321)   iopamidol (ISOVUE-370) 76 % injection 75 mL (has no administration in time range)   dilTIAZem HCl-Sodium Chloride 125 mg / 125 mL infusion (has no administration in time range)   lidocaine PF 1 % injection 5 mL (has no administration in time range)   sodium chloride flush 0.9 % injection 5-40 mL (has no administration in time range)   sodium chloride flush 0.9 % injection 5-40 mL (has no administration in time range)   0.9 % sodium chloride infusion (has no administration in time range)   dilTIAZem injection 10 mg (10 mg IntraVENous Given 3/10/24 0321)   sodium chloride 0.9 % bolus 500 mL (500 mLs IntraVENous New Bag 3/10/24 0320)   HYDROcodone-acetaminophen (NORCO) 5-325 MG per tablet 1 tablet (1 tablet Oral Given 3/10/24 0146)        Emergency department course:  ED Course as of 03/10/24 0618   Sun Mar 10, 2024   0357 Patient has a history of A-fib and is not anticoagulated secondary to prior GI bleed.  He tripped and fell while he was not using his walker.  Foot is cold, blue, without pulses.  Daughter now thinks that that may have been the case for the past day or 2, but she is unclear.  Patient initially atrial fibrillation with RVR on EKG but family insisted they want to take the patient home.  Family is now amenable to admission and the patient is on Cardizem.  We are awaiting CTA with runoff prior to starting heparin due to his prior history of GI bleed and frequent falls.  Patient is a full code according to family wishes.  Patient is demented oriented x 2.  He did receive IV fluids.  Anion gap is 18. [AM]   0401 We are awaiting CTA with runoff at which time I will contact appropriate service for admission and will consider plus or minus heparin at that point [AM]      ED Course User Index  [AM] Eden Salcido MD        Impression:    ICD-10-CM    1. Atrial fibrillation with RVR (HCC)  I48.91               Eden Salcido MD  03/10/24 0618

## 2024-03-10 NOTE — H&P
Hospital Medicine History & Physical      PCP: Karlos Corley MD    Date of Admission: 3/10/2024    Date of Service: Pt seen/examined on 3/10/2024 12:14 AM and   Admitted to Inpatient with expected LOS greater than two midnights due to medical therapy.       Chief Complaint: Fall and right hip pain    History Of Present Illness:    89 y.o. male with PMHx significant for dementia, hypertension, paroxysmal atrial fibrillation admitted to the hospital after he had a fall  Patient unable to give much history although history was obtained from the daughter who he lives with.  Patient's daughter states that patient was coming out of the bathroom and fell.  The fall was not witnessed on further inquiry patient had complained of some right foot pain and the foot feeling cold a couple of days ago she thought that patient had some sort of fungal infection and put some cream on it.  Since then has had some increased pain and unable to kind of move his leg much  Over the past few days also has noticed decrease in appetite and not tolerating many fluids  No fevers no sweats  Today noticed that the leg was blue and very cold  Subsequently brought him into the hospital after a fall    Patient denies any chest pain shortness of breath  Complains of some pain in the lower abdomen  Past Medical History:          Diagnosis Date    Atrial fibrillation (HCC)     Cancer (HCC)     prostate cancer    Cellulitis     hx of left leg    CHF (congestive heart failure) (HCC)     Diverticulitis     Hypertension     Morbid obesity (HCC)        Past Surgical History:          Procedure Laterality Date    ABDOMEN SURGERY      COLONOSCOPY N/A 10/29/2019    COLONOSCOPY DIAGNOSTIC performed by Eliseo Casper MD at [facility] ENDOSCOPY    COLONOSCOPY N/A 10/31/2019    COLONOSCOPY POLYPECTOMY SNARE/COLD BIOPSY performed by Eliseo Casper MD at [facility] ENDOSCOPY    HIP SURGERY Left 11/10/2023    LEFT HIP HEMIARTHROPLASTY-DEPUY performed by Arsalan Suarez,  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 However, there is no evidence of aneurysm leak or rupture.   Further follow-up of this abnormality is as advised below.   11. Cholelithiasis, without evidence of cholecystitis.   12. Stable bilateral adrenal adenomas.   13. Stable bilateral renal cysts.   14. Stable mild prostatomegaly.   15. Additional findings are described above.      RECOMMENDATIONS:   For management of fusiform AAA:      3.0-3.4 cm AAA, recommend follow-up every 3 years.      Note: Recommend Vascular consultation if a fusiform AAA enlarges by >0.5 cm   in 6 months or >1 cm in 1 year or for a saccular AAA of any size.      References: J Am Marin Radiol 2013; 10(10):789-794; J Vasc Surg. 2018; 67:2-77         CT CERVICAL SPINE WO CONTRAST   Preliminary Result   No acute abnormality of the cervical spine.         CT HEAD WO CONTRAST   Preliminary Result   No acute intracranial abnormality.      Age related changes including chronic small vessel ischemic disease and   cerebral atrophy.         XR HIP 2-3 VW W PELVIS RIGHT   Final Result   Total left hip replacement which is unchanged.      Moderate osteoarthritic changes right hip and diffuse osteopenia with no   acute bony abnormality seen         XR TIBIA FIBULA RIGHT (2 VIEWS)   Final Result   Status post total right knee replacement in good position.      Diffuse osteopenia with no acute bony abnormality.         XR CHEST PORTABLE   Preliminary Result   1. No acute cardiopulmonary disease.             ASSESSMENT/PLAN:    Active Hospital Problems    Diagnosis Date Noted    Fall against object [W18.00XA] 03/10/2024       Acute Medical Issues Being Addressed:    89-year-old admitted to the hospital after a fall    Right lower extremity weakness and cyanosis secondary to occlusion of the right superficial femoral artery probable embolic source

## 2024-03-10 NOTE — PROGRESS NOTES
Patient to PACU from OR. Drowsy. VSS. Afib on tele. Surgical dressings to right leg intact. Right foot cool. Doppler right pedal pulse was found. CXR done to verify central line placement. Verified at bedside per anesthesia - heparin gtt and cardizen gtts restarted.     Will monitor.

## 2024-03-10 NOTE — PROGRESS NOTES
Notified Vascular about right fasciotomy site bleeding and BP dropping. Received orders for labs and thanh synephrine. Also received orders to ace wrap right leg tightly and elevate leg above heart.

## 2024-03-10 NOTE — CONSULTS
Vascular Surgery Consultation    Bimal Griffin  [id number]  3/10/2024  6/6/1932    Date of Admission:  3/10/2024 12:14 AM  Date of Consultation:  3/10/2024    PCP:  Karlos Corley MD       Chief Complaint: Right leg ischemia    History of Present Illness:   Bimal Griffin is a 89 y.o. male who was brought to the ED by family, after falling at home.  All information obtained from family members, mainly daughter with whom he lives, as pt has dementia.  She states that he began complaining of pain in right leg on Thursday 3/7, and stayed in bed for about 24 hours.  On Friday, was able to sit to stand at bedside, but then began having more pain and coldness of the leg that day and has been in bed ever since.  Daughter tried applying heating pads to leg, with no improvement.  On presentation here, he was in Afib RVR.  Now, rate controlled on Amio gtt.  H/o CHF but I don't see any documentation of recent Echo.  Last OV with Cardiology Dr. Rashawn Begum 1/17/2023 ([facility]).  Echo in 2020 showed EF 55%.      I personally reviewed images of CTA Aorta with BLE Runoff 3/10/2024   AORTA/ABDOMINAL ARTERIAL ANATOMY:     There is mild atherosclerotic disease of the abdominal aorta.  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  However, there is no evidence of aneurysm leak or rupture.  There is no  evidence of aortic stenosis or dissection.  The celiac axis and its branch  vessels are patent throughout their courses.  The SMA is patent throughout  its course.  The CINDY is patent.  Patent renal arteries are noted bilaterally.     INFLOW ARTERIAL ANATOMY:     There is mild-to-moderate atherosclerotic disease of the bilateral  iliofemoral arterial systems.  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   03/10/24  0050      K 4.2      CO2 19*   BUN 24*   CREATININE 1.0   CALCIUM 9.5     Cardiac Enzymes:   Recent Labs     03/10/24  0734   CKTOTAL 570*     PT/INR:   Recent Labs     03/10/24  0734   PROTIME 15.7*   INR 1.25*     APTT:   Recent Labs     03/10/24  0734   APTT 27.8         Diagnosis:    Right lower extremity acute ischemia  Congestive heart failure  Dementia    Plan:   Discussed options in detail with pt and family, particularly the daughter who lives with and takes care of him.  He has immediate limb threatening ischemia of the right leg--motor and sensory are already absent and daughter says she thinks symptoms have been present for at least 48 hours.  He has been in bed that whole time due to leg pain.  That said, though he does have cyanosis, he does have cap refill of the toes.  I advised her that he is not the best candidate for surgery; however, I would offer attempted embolectomy in the hopes this is a cardiac embolus and there is still some reserve left.  It is still not clear what is source of ischemia, but I think cardiac embolus due to afib is a likely source.  He would also require fasciotomies.  I told them there is a high risk this would not be successful and if not, then he would require either amputation vs palliative care.     After a lengthy discussion regarding risk and benefits, they would like to proceed.  All questions were answered.

## 2024-03-10 NOTE — BRIEF OP NOTE
Brief Postoperative Note      Patient: Bimal Griffin  YOB: 1932  MRN: [id number]    Date of Procedure: 3/10/2024    Pre-Op Diagnosis Codes:     * Cold right foot [R20.9]    Post-Op Diagnosis: Same       Procedure(s):  FEMORAL EMBOLECTOMY THROMBECTOMY/LOWER EXTREMITY DECLOT  FASCIOTOMY COMPARTMENT RELEASE    Surgeon(s):  Sarah Wilhelm MD    Assistant:  Surgical Assistant: Obi Haines    Anesthesia: General    Estimated Blood Loss (mL): less than 100     Complications: None    Specimens:   * No specimens in log *    Implants:  Implant Name Type Inv. Item Serial No.  Lot No. LRB No. Used Action   CLIP LIG M BONNY TI HRT SHP WIRE HORZ 6 CLIPS PER PK - [id number]  CLIP LIG M BONNY TI HRT SHP WIRE HORZ 6 CLIPS PER PK  [employer] [id number] Right 1 Implanted   CLIP INT WECK SM WIDE RED TI TRNSVRS GRV CHEVRON SHP W/ PERCIS TIP 6 PER PK - [id number]  CLIP INT WECK SM WIDE RED TI TRNSVRS GRV CHEVRON SHP W/ PERCIS TIP 6 PER PK  [employer] [id number] Right 1 Implanted         Drains:   Urinary Catheter 03/10/24 2 Way (Active)       Findings: Large amount of acute thrombus.  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  No doppler signals in the foot at the end of the case.  tPA 5mg solution injected into distal outflow.      Electronically signed by Sarah Wilhelm MD on 3/10/2024 at 2:59 PM

## 2024-03-10 NOTE — ED PROVIDER NOTES
[facility] EMERGENCY DEPARTMENT  EMERGENCY DEPARTMENT ENCOUNTER        Pt Name: Bimal Griffin  MRN: [id number]  Birthdate 6/6/1932  Date of evaluation: 3/10/2024  Provider: Alfreda Ricci PA-C  PCP: Karlos Corley MD  Note Started: 3:38 AM EDT 3/10/24       I have seen and evaluated this patient with my supervising physician Eden Salcido MD.      CHIEF COMPLAINT       Chief Complaint   Patient presents with    Hip Pain     From home via EMS. C/O right hip pain. PT in afib on arrival.       HISTORY OF PRESENT ILLNESS: 1 or more Elements     History From: Patient, daughter at bedside  Limitations to history : History of dementia    Bimal Griffin is a 89 y.o. male who presents to the emergency department today for evaluation for concerns of a fall, and concerns of right hip pain.    Patient is alert to person and place, he does have a history of dementia and this is his baseline.  Patient tells me that he fell out of the bathroom and he cannot remember the details of the fall.    Daughter is at baseline, and reports that the patient was coming out of the bathroom she states that he does not walk with his walker, which caused him to fall.  With the patient arrives to the ED he is in A-fib with RVR, he is not on any anticoagulation as per the daughter he did have a history of GI bleed years ago.  She reports that yesterday she noticed that his right foot appeared to be blue, and cool to the touch.  She thought that this was due to a cream that she was putting on the foot for athlete's foot.  She states that the patient otherwise has had a decrease in appetite, although is tolerating fluids.  She states that she is taking him to the doctor for weight checks.  There is not been any cough.  No vomiting or diarrhea    Nursing Notes were all reviewed and agreed with or any disagreements were addressed in the HPI.    REVIEW OF SYSTEMS :      Review of Systems   Constitutional:  Negative for  dorsalis pedis or posterior tibialis pulses.  Unable to find with Doppler.    Mild reproducible tenderness noted lateral aspect of the right hip.  Full passive range of motion.  He does have a femoral pulse are 2+.  No tenderness noted over the knee   Skin:     General: Skin is warm and dry.   Neurological:      General: No focal deficit present.      Mental Status: He is alert. Mental status is at baseline.   Psychiatric:         Behavior: Behavior normal.             DIAGNOSTIC RESULTS   LABS:    Labs Reviewed   CBC WITH AUTO DIFFERENTIAL - Abnormal; Notable for the following components:       Result Value    Hemoglobin 12.6 (*)     Hematocrit 38.3 (*)     RDW 20.4 (*)     Neutrophils Absolute 8.8 (*)     Lymphocytes Absolute 0.8 (*)     All other components within normal limits   BASIC METABOLIC PANEL - Abnormal; Notable for the following components:    CO2 19 (*)     Anion Gap 18 (*)     Glucose 129 (*)     BUN 24 (*)     All other components within normal limits   TROPONIN - Abnormal; Notable for the following components:    Troponin, High Sensitivity 49 (*)     All other components within normal limits   TROPONIN   PROTIME-INR   APTT       When ordered only abnormal lab results are displayed. All other labs were within normal range or not returned as of this dictation.    EKG: When ordered, EKG's are interpreted by the Emergency Department Physician in the absence of a cardiologist.  Please see their note for interpretation of EKG.    RADIOLOGY:   Non-plain film images such as CT, Ultrasound and MRI are read by the radiologist. Plain radiographic images are visualized and preliminarily interpreted by the ED Provider with the below findings:        Interpretation per the Radiologist below, if available at the time of this note:    CT CERVICAL SPINE WO CONTRAST   Preliminary Result   No acute abnormality of the cervical spine.         CT HEAD WO CONTRAST   Preliminary Result   No acute intracranial abnormality.

## 2024-03-10 NOTE — ED NOTES
Patient came into the ER because he had fallen and his right hip hurt.  On assessing patient I noticed discoloration and in lower legs and legs lower cold.  Used doppler and no pulse pedal pulse absent. Reported to Alfreda NATHAN.  Tried to put IV in patient and could not so I straight stuck patient to get labs. Patient was in A-fib with RVR and the DrChucky Ordered medication by IV.  Several other nurses used US to put another IV in patient and it blow. Patient did not want to be stuck again so waiting on Picc tean to show up to put line in so I can get labs and do patients medication

## 2024-03-10 NOTE — ED PROVIDER NOTES
6:05 AM EDT: I discussed the history, physical examination, laboratory and imaging studies, and treatment plan with Dr. Salcido. Bimal Griffin was signed out to me in stable condition. Please see Dr. Salcido's documentation for details of their history, physical, and laboratory studies.  Upon re-examination, a summary of Bimal Griffin's history, physical examination, and studies are as follows:       Chief complaint: Hip Pain (From home via EMS. C/O right hip pain. PT in afib on arrival.)      Arrived in rapid a f, on cardizem, family doesn't want to take him home, cool foot, CTA c runoff pending IV placement.  PICC team inbound.      On my evaluation of the patient he is in rapid atrial fibrillation.  The right foot is cool and has a bluish discoloration relative to the left groin which is more pink and warm.  There is no palpable pulse in the right foot and no dopplerable pulse either.  Family reports that he has a history of prior GI bleeding and is concerned about blood thinners in this patient.  History From: History from : Patient and Family    Limitations to history : Mild dementia    EKG  The Ekg interpreted by me shows  atrial fibrillation with a rate of 135  Axis is   Left axis deviation  QTc is  normal  ST Segments: no acute change  No significant change from prior EKG dated 8 nov 2023    Labs Reviewed   CBC WITH AUTO DIFFERENTIAL - Abnormal; Notable for the following components:       Result Value    Hemoglobin 12.6 (*)     Hematocrit 38.3 (*)     RDW 20.4 (*)     Neutrophils Absolute 8.8 (*)     Lymphocytes Absolute 0.8 (*)     All other components within normal limits   BASIC METABOLIC PANEL - Abnormal; Notable for the following components:    CO2 19 (*)     Anion Gap 18 (*)     Glucose 129 (*)     BUN 24 (*)     All other components within normal limits   TROPONIN - Abnormal; Notable for the following components:    Troponin, High Sensitivity 49 (*)     All other components within normal limits  arterial disease    CONSULTS: (Who and What was discussed)  None    Discussion with Other Profesionals : Admitting Team hospitalist and Consultant Dr. Wilhelm, vascular surgeon            CC/HPI Summary, DDx, ED Course, and Reassessment: On arrival patient was in A-fib and that was noted and treatment initiated.  He also had a right foot that was cool and blue which was very concerning given his history of A-fib for an ischemic foot.  No pulse was palpable or dopplerable.  There was difficulty obtaining IV access but eventually this was obtained and a CT scan obtained that showed severe peripheral arterial disease but on the left there was good reconstitution to the foot and none on the right.  I spoke with the on-call vascular surgeon who evaluated the patient at the bedside and decided to go to the OR.  I did have discussion with the family regarding heparin and anticoagulation in the setting of his prior history apparently of a GI bleed.  They understand that heparin is The safest anticoagulant we can use.  For this reason I ordered it with her consent.  ED Course as of 03/10/24 0605   Sun Mar 10, 2024   0357 Patient has a history of A-fib and is not anticoagulated secondary to prior GI bleed.  He tripped and fell while he was not using his walker.  Foot is cold, blue, without pulses.  Daughter now thinks that that may have been the case for the past day or 2, but she is unclear.  Patient initially atrial fibrillation with RVR on EKG but family insisted they want to take the patient home.  Family is now amenable to admission and the patient is on Cardizem.  We are awaiting CTA with runoff prior to starting heparin due to his prior history of GI bleed and frequent falls.  Patient is a full code according to family wishes.  Patient is demented oriented x 2.  He did receive IV fluids.  Anion gap is 18. [AM]   0401 We are awaiting CTA with runoff at which time I will contact appropriate service for admission and  will consider plus or minus heparin at that point [AM]      ED Course User Index  [AM] Eden Salcido MD       Differential diagnostic considerations: Ischemic foot, rapid A-fib, ACS, sepsis    I am the Primary Physician of Record.    I personally saw this patient and independently provided 90 minutes of non-concurrent critical care out of the total shared critical care time provided.             FINAL IMPRESSION  1. Atrial fibrillation with RVR ([facility])    2. Ischemic foot    3. Closed fracture of sacrum, unspecified portion of sacrum, initial encounter ([facility])    4. Superficial femoral artery occlusion ([facility])        Vitals:  Blood pressure (!) 43/28, pulse 97, temperature (P) 96.9 °F (36.1 °C), resp. rate 18, height 1.829 m (6'), weight 72.6 kg (160 lb), SpO2 90 %.       Brandon Perez MD  03/11/24 4446

## 2024-03-11 VITALS
DIASTOLIC BLOOD PRESSURE: 28 MMHG | HEART RATE: 97 BPM | OXYGEN SATURATION: 90 % | SYSTOLIC BLOOD PRESSURE: 43 MMHG | TEMPERATURE: 96.8 F | BODY MASS INDEX: 21.67 KG/M2 | WEIGHT: 160 LBS | RESPIRATION RATE: 18 BRPM | HEIGHT: 72 IN

## 2024-03-11 LAB
BASE EXCESS BLDA CALC-SCNC: -7 MMOL/L (ref -3–3)
BASE EXCESS BLDV CALC-SCNC: -20 MMOL/L (ref -3–3)
CA-I BLD-SCNC: 1.08 MMOL/L (ref 1.12–1.32)
CA-I BLD-SCNC: 1.12 MMOL/L (ref 1.12–1.32)
CO2 BLDA-SCNC: 19 MMOL/L
CO2 BLDV-SCNC: 9 MMOL/L
GLUCOSE BLD-MCNC: 124 MG/DL (ref 70–99)
GLUCOSE BLD-MCNC: 129 MG/DL (ref 70–99)
HCO3 BLDA-SCNC: 17.7 MMOL/L (ref 21–29)
HCO3 BLDV-SCNC: 8.2 MMOL/L (ref 23–29)
HCT VFR BLD AUTO: 22 % (ref 40.5–52.5)
HCT VFR BLD AUTO: 30 % (ref 40.5–52.5)
HGB BLD CALC-MCNC: 10.1 GM/DL (ref 13.5–17.5)
HGB BLD CALC-MCNC: 7.5 GM/DL (ref 13.5–17.5)
LACTATE BLD-SCNC: 10.72 MMOL/L (ref 0.4–2)
LACTATE BLD-SCNC: 16.47 MMOL/L (ref 0.4–2)
PCO2 BLDA: 27.3 MM HG (ref 35–45)
PCO2 BLDV: 22.7 MM HG (ref 40–50)
PERFORMED ON: ABNORMAL
PERFORMED ON: ABNORMAL
PH BLDA: 7.42 [PH] (ref 7.35–7.45)
PH BLDV: 7.17 [PH] (ref 7.35–7.45)
PO2 BLDA: 119.3 MM HG (ref 75–108)
PO2 BLDV: 136 MM HG
POC SAMPLE TYPE: ABNORMAL
POC SAMPLE TYPE: ABNORMAL
POTASSIUM BLD-SCNC: 3.7 MMOL/L (ref 3.5–5.1)
POTASSIUM BLD-SCNC: 5.6 MMOL/L (ref 3.5–5.1)
SAO2 % BLDA: 99 % (ref 93–100)
SAO2 % BLDV: 98 %
SODIUM BLD-SCNC: 146 MMOL/L (ref 136–145)
SODIUM BLD-SCNC: 155 MMOL/L (ref 136–145)

## 2024-03-11 PROCEDURE — 2580000003 HC RX 258: Performed by: INTERNAL MEDICINE

## 2024-03-11 PROCEDURE — 84295 ASSAY OF SERUM SODIUM: CPT

## 2024-03-11 PROCEDURE — 6360000002 HC RX W HCPCS: Performed by: INTERNAL MEDICINE

## 2024-03-11 PROCEDURE — 83605 ASSAY OF LACTIC ACID: CPT

## 2024-03-11 PROCEDURE — 82803 BLOOD GASES ANY COMBINATION: CPT

## 2024-03-11 PROCEDURE — 2500000003 HC RX 250 WO HCPCS: Performed by: INTERNAL MEDICINE

## 2024-03-11 PROCEDURE — 82947 ASSAY GLUCOSE BLOOD QUANT: CPT

## 2024-03-11 PROCEDURE — 85014 HEMATOCRIT: CPT

## 2024-03-11 PROCEDURE — 82330 ASSAY OF CALCIUM: CPT

## 2024-03-11 PROCEDURE — 84132 ASSAY OF SERUM POTASSIUM: CPT

## 2024-03-11 RX ADMIN — CEFTRIAXONE SODIUM 2000 MG: 2 INJECTION, POWDER, FOR SOLUTION INTRAMUSCULAR; INTRAVENOUS at 02:51

## 2024-03-11 RX ADMIN — SODIUM BICARBONATE 150 MEQ: 84 INJECTION, SOLUTION INTRAVENOUS at 00:00

## 2024-03-11 RX ADMIN — SODIUM CHLORIDE: 9 INJECTION, SOLUTION INTRAVENOUS at 02:49

## 2024-03-11 NOTE — PROGRESS NOTES
Pt with agonal breathing not responding, sternal rub done no response.  Attempted to obtain ABGs, Dr. Balderas notified of patient's status and requested to report to the bedside.  Dr. Balderas reported to the bedside, family there as well.  ABG completed and abnormal lab values noted.  Dr. Balderas ordered an increase of bicarb drip to 150ml/hr, bolus of 500ml of .45% NS administered, Levo started, Dominick increased to 200mcg/min.  Calcium chloride given, 4 amps of bicarb given.  Levo was increase and Dominick was weaned off.  Code status was changed to DNR-CC.  Family not agreeable to intubation or blood at this time.

## 2024-03-11 NOTE — PROGRESS NOTES
Alerted Dr. Balderas in regard to patient's status, pt not responding, order received to begin Vasopressin, do another arterial blood gas.  Reviewed orders with the family and daughter requested that medications not be titrated, requested vasopressin not be given, and requested blood gas not to be completed.  Daughter is the decision maker and requested that pt \"go to sleep and be as comfortable as he can be.\"  Verbalized understanding of her wishes.

## 2024-03-11 NOTE — DEATH NOTES
[facility] Medicine  Death/Discharge Summary    Name:Bimal Griffin       :  1932              MRN:  [id number]    Admit date:  3/10/2024    Discharge date:  3/11/2024    Admitting Physician:  Sagar Valdez MD  Discharge Physician: Mike Dunaway MD          Admission Condition:  critical    Discharged Condition:      History of Present Illness: 89 y.o. male with PMHx significant for dementia, hypertension, paroxysmal atrial fibrillation admitted to the hospital after he had a fall  Patient unable to give much history although history was obtained from the daughter who he lives with.  Patient's daughter states that patient was coming out of the bathroom and fell.  The fall was not witnessed on further inquiry patient had complained of some right foot pain and the foot feeling cold a couple of days ago she thought that patient had some sort of fungal infection and put some cream on it.  Since then has had some increased pain and unable to kind of move his leg much  Over the past few days also has noticed decrease in appetite and not tolerating many fluids  No fevers no sweats  Today noticed that the leg was blue and very cold  Subsequently brought him into the hospital after a fall     Patient denies any chest pain shortness of breath  Complains of some pain in the lower abdomen    Noted to have compartment syndrome of the right LE and arterial occlusion., went to OR for   FEMORAL EMBOLECTOMY THROMBECTOMY/LOWER EXTREMITY DECLOT  FASCIOTOMY COMPARTMENT RELEASE   He was very high risk.  Patient is a Denominational, refuses any blood transfusion   He declined over night and was having increasing lactic acid, low blood pressure, and cold right leg  Lactic acid elevated and was rising despite bicarb.  Per Night doc note  Code status was changed to DNR-CC. Family not agreeable to intubation or blood at this time.       Discharge Physical Exam:    Called by nurse that patient had become asystole on

## 2024-03-11 NOTE — PROGRESS NOTES
ABG done bedside due to patient lethargic and decreased responsiveness  at 2048  Ph 7.168  Co2 22.7  PO2 135.5  HCO3 8.2  BE -20.3  SO2 98.3  Lac 10.72  Received order from Dr Balderas to give 3 Amp of sodium bicarb and to start sodum bicarb drip   no

## 2024-03-11 NOTE — PROGRESS NOTES
Received report from Obi OVERTON in regard to patient.  Upon assessment pt unable to verbalize or follow commands, message sent to Dr. Balderas in regard to patient status.  Patient's blood pressure is low, family at bedside refusing blood products.  Heparin stopped by Obi due to elevated APTT of >180.  Unable to obtain doppler pulses other than left pedal.  Pt remained a full code, Dr. Balderas reported to the bedside.  Pt was given 3 amps of bicarb, bolus of 500ml LR administered, blood gas performed.  Abnormal values for blood gas noted, prepared to intubate, but family declined at the time.  Dr. Balderas spoke with surgeon for pt, Dominick increased, cardizem drip was stopped by off going nurse, Bicarb drip of 75ml/hr was started then changed to 50ml per hr by Dr. Balderas.  Dr. Balderas discussed with family the status of the patient.  Family voiced understanding.  Dr. Balderas wanted to intubate pt, however family declined.  Oxygen started on 2L NC.  Order received to check an ABG at midnight.

## 2024-03-11 NOTE — PROGRESS NOTES
Paged by patient's nurse, due to concerns for agonal breathing.  In summary, 89-year-old male with a past medical history of dementia hypertension paroxysmal A-fib admitted to the hospital to the ED 3/10/2024 after he had a fall.  Patient is a Buddhist, refuses any blood transfusion  Further evaluation showed right lower extremity weakness and cyanosis secondary to occlusion of his right superficial femoral artery, presence of a right gluteal hematoma, presence of atrial fibs with RVR initiated on IV heparin and diltiazem drip.  Admitting physician discussed with patient's and with vascular surgeon with plan to do an embolectomy.  CODE STATUS full code.  Patient had procedure done today, femoral embolectomy thrombectomy/lower extremity declot fasciotomy compartment release done by surgeon Dr. Wilhelm.  On arrival to patient's room, patient's daughter and granddaughter at bedside.   Current drips running include phenylephrine, and protamine.  Cardizem drip and heparin drip discontinued.  Explained to them current situation of Mr. Griffin.    On examination, patient is mildly responsive, cold right extremity with leg currently in an Ace bandage no signs of bleeding.  CBC done shows hemoglobin of 10.3 from 12.6.   Called his surgeon and updated her with regards to current condition.  Blood pressure, systolic 61/46, respirate rate of 26, with agonal breathing.    Initial arterial blood gas showed a pH of 7.16 with a bicarb of 9 and a low pCO2.  Initial lactate of 12.2.  Discussed with patient's family at bedside regarding concerns that patient might need to be intubated.  Patient daughter discussed with family members over the phone and the plan was if needed patient should be intubated.  Patient was given a bolus of 500 cc, given 3 amp of bicarb-150meq, and a bicarb drip was ordered.  Improvement in mental status, blood pressure currently.  Blood pressure 113/58 with a pulse in the 120s respiratory rate  between 19-26.  Patient is more responsive follows simple commands.  Repeat blood gas shows a pH of 7.39, bicarb of 22, pCO2 of 133.6 bicarb of 13.9 saturating 99% on 2 L.  Plan to intubate deferred.  Patient currently on a bicarb drip at 50 cc/h, and on phenylephrine drip.  Plan-monitor operative site for active bleeding  -Repeat CBC, concerns for hemodilution on recent lab work  -Repeat ABG, follow-up with results,  -switch fluids to LR  -Lab/CBC BMP/ lactic acid every 4 hours  -Continue to titrate vasopressors as tolerated with a MAP goal of 65  -Prognosis poor-I do not believe patient would benefit from intubation at this point in time based on his underlying comorbid diseases and conditions.  Discussion with family with regards to possible palliative care moving forward.        New encounter 11.10pm  Paged again by patient's nurse, patient not responsive with concerns of agonal breathing  On arrival to patient's bedside, patient's family in the room including son and daughters and grandchildren.  On examination patient minimally responsive.  Stat ABG was done with pH of 7.026 pCO2 of 38 bicarb of 9.9 base deficit of 21, lactic acid of 12.64.  Low blood pressure with maps in the 40s.  Hemoglobin of 9.1 hematocrit of 27.0  Patient was given 4 amp of bicarb ,1 amp of calcium gluconate and continued on a bicarb drip at 150 cc/h.  Due to his hypotension Levophed was initiated.  Still on phenylephrine gtt.  Noticed improvement in his mental status after bicarb pushes.  Improvement in his blood pressure.  Able to wean him off the phenylephrine, on Levophed at 20, with maps greater than 75.  Repeat ABG after boluses of bicarb showed pH of 7.42, CO2 of 27.3, bicarb of 17.7 with a base deficit of 7.  Noticed improvement in his mental status able to communicate.  Family members discussed amongst themselves and all in agreement, plan is to make patient DNR CC.  CODE STATUS changed in the system  Will continue patient on  current bicarb drip and levo drip.  IV Rocephin will be given.  Follow-up with primary team in a.m.

## 2024-03-11 NOTE — PROGRESS NOTES
Pt not responding Dr. Balderas called to the bedside, order received to give 3 amps of bicarb, increase drip to 150ml/hr. Family at the bedside.  Levo order received

## 2024-03-11 NOTE — PROGRESS NOTES
RN referral for family support after patient's death.  Multiple family gathered in room.  Spoke with daughter, Pauline, who shared stories of patient's life.   Patient, Pauline, and a few members of family are of the [employer]; others are J[employer].  Will follow with family as needed.

## 2024-03-13 NOTE — OP NOTE
Operative Note      Patient: Bimal Griffin  YOB: 1932  MRN: [id number]    Date of Procedure: 3/10/2024    Pre-Op Diagnosis Codes:     * Cold right foot [R20.9]    Post-Op Diagnosis: Same       Procedure(s):  FEMORAL EMBOLECTOMY THROMBECTOMY/LOWER EXTREMITY DECLOT  FASCIOTOMY COMPARTMENT RELEASE    Surgeon(s):  Sarah Wilhelm MD    Assistant:   Surgical Assistant: Obi Haines    Anesthesia: General    Estimated Blood Loss (mL): less than 100     Complications: None    Specimens:   * No specimens in log *    Implants:  Implant Name Type Inv. Item Serial No.  Lot No. LRB No. Used Action   CLIP LIG M BONNY TI HRT SHP WIRE HORZ 6 CLIPS PER PK - [id number]  CLIP LIG M BONNY TI HRT SHP WIRE HORZ 6 CLIPS PER PK  [employer] [id number] Right 1 Implanted   CLIP INT WECK SM WIDE RED TI TRNSVRS GRV CHEVRON SHP W/ PERCIS TIP 6 PER PK - [id number]  CLIP INT WECK SM WIDE RED TI TRNSVRS GRV CHEVRON SHP W/ PERCIS TIP 6 PER PK  [employer] [id number] Right 1 Implanted         Drains:   [REMOVED] Urinary Catheter 03/10/24 2 Way (Removed)   Catheter Indications Need for fluid volume management of the critically ill patient in a critical care setting 03/10/24 2000   Site Assessment Pink;No urethral drainage 03/10/24 2000   Urine Appearance Hazy 03/10/24 2000   Status Patent 03/10/24 2000   Output (mL) 50 mL 03/11/24 0700       Findings: Large amount of acute thrombus.  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  No doppler signals in the foot at the end of the case.  tPA 5mg solution injected into distal outflow.        Detailed Description of Procedure:   The patient was taken to the operating room and placed in a supine position, prepped and draped in the usual sterile fashion using betadine solution.  Time out was performed and patient and operative site appropriately identified per hospital protocol, and patient was given IV  The calf was soft.  The medial and lateral calf incisions were relatively hemostatic and wet-to-dry dressings were placed followed by Ace wrap's.  At the conclusion of the procedure, the foot remained pulseless.  This was discussed with the family in detail.  He was taken to the ICU in stable but critical condition.  Needle, sponge, instrument counts were correct.    All bleeding points were controlled.  The wounds were copiously irrigated with heparinized saline solution.    Electronically signed by Sarah Wilhelm MD on 3/13/2024 at 1:24 PM

## 2024-04-01 NOTE — PROGRESS NOTES
Physician Progress Note      PATIENT:               JOHNNY COLINDRES  CSN #:                  [id number]  :                       1932  ADMIT DATE:       3/10/2024 12:14 AM  DISCH DATE:        3/11/2024 12:24 PM  RESPONDING  PROVIDER #:        Abdoul Abarca MD          QUERY TEXT:    Pt admitted with right hip pain after a fall and has shock documented.  If   possible, please document in progress notes and discharge summary further   specificity regarding the type of shock:    The medical record reflects the following:  Risk Factors: compartment syndrome    Clinical Indicators: Per 3/11 Death/Discharge note- \"Noted to have compartment   syndrome of the right LE and arterial occlusion., went to OR for  FEMORAL EMBOLECTOMY THROMBECTOMY/LOWER EXTREMITY DECLOT  FASCIOTOMY COMPARTMENT RELEASE  He was very high risk.  Patient is a [employer], refuses any blood transfusion  He declined over night and was having increasing lactic acid, low blood   pressure, and cold right leg  Lactic acid elevated and was rising despite bicarb.  Per Night doc note  Code status was changed to DNR-CC. Family not agreeable to intubation or blood   at this time.  Discharge Physical Exam:  Called by nurse that patient had become asystole on telemetry. Patient seen   and examined. No palpable pulse. Pupils fixed and dilated. No cardiac or   pulmonary activity. Patient pronounced dead.  Time of Death: 7:20 am  Cause of Death: shock    Treatment: Code status changed to DNR-CC  Options provided:  -- Cardiogenic Shock  -- Hypovolemic Shock  -- Other - I will add my own diagnosis  -- Disagree - Not applicable / Not valid  -- Disagree - Clinically unable to determine / Unknown  -- Refer to Clinical Documentation Reviewer    PROVIDER RESPONSE TEXT:  Patient was  the only time I examined him.  Please redirect this to Dr. Balderas or Dr. Valdez.    Query created by: Evelyn Luis on 3/12/2024 2:41 PM      Electronically signed by:

## 2024-04-05 NOTE — PROGRESS NOTES
Physician Progress Note      PATIENT:               JOHNNY COLINDRES  [facility] #:                  [id number]  :                       1932  ADMIT DATE:       3/10/2024 12:14 AM  DISCH DATE:        3/11/2024 12:24 PM  RESPONDING  PROVIDER #:        Sagar PICHARDO MD          QUERY TEXT:    Pt admitted with right hip pain after a fall and has shock documented.  If   possible, please document in progress notes and discharge summary further   specificity regarding the type of shock:    The medical record reflects the following:  Risk Factors: compartment syndrome    Clinical Indicators: Per 3/11 Death/Discharge note- \"Noted to have compartment   syndrome of the right LE and arterial occlusion., went to OR for  FEMORAL EMBOLECTOMY THROMBECTOMY/LOWER EXTREMITY DECLOT  FASCIOTOMY COMPARTMENT RELEASE  He was very high risk.  Patient is a [employer], refuses any blood transfusion  He declined over night and was having increasing lactic acid, low blood   pressure, and cold right leg  Lactic acid elevated and was rising despite bicarb.  Per Night doc note  Code status was changed to DNR-CC. Family not agreeable to intubation or blood   at this time.  Discharge Physical Exam:  Called by nurse that patient had become asystole on telemetry. Patient seen   and examined. No palpable pulse. Pupils fixed and dilated. No cardiac or   pulmonary activity. Patient pronounced dead.  Time of Death: 7:20 am  Cause of Death: shock    Treatment: Code status changed to DNR-CC  Options provided:  -- Cardiogenic Shock  -- Hypovolemic Shock  -- Other - I will add my own diagnosis  -- Disagree - Not applicable / Not valid  -- Disagree - Clinically unable to determine / Unknown  -- Refer to Clinical Documentation Reviewer    PROVIDER RESPONSE TEXT:    Suspected combination of hemorrhagic and cardiogenic shock    Query created by: Evelyn Luis on 2024 2:11 PM      QUERY TEXT:    Pt admitted with right gluteal hematoma. Pt noted to  have decline in mental   status. If possible, please document in the progress notes and discharge   summary if you are evaluating and / or treating any of the following:      The medical record reflects the following:  Risk Factors: mental status decline after embolectomy    Clinical Indicators: Per 3/10 PN- \"On examination, patient is mildly   responsive. CBC done shows hemoglobin of 10.3 from 12.6. Called his surgeon   and updated her with regards to current condition.  Blood pressure, systolic   61/46, respirate rate of 26, with agonal breathing. Initial arterial blood gas   showed a pH of 7.16 with a bicarb of 9 and a low pCO2.  Initial lactate of   12.2. Patient was given a bolus of 500 cc, given 3 amp of bicarb-150meq, and a   bicarb drip was ordered.  Improvement in mental status, Blood pressure 113/58   with a pulse in the 120s respiratory rate betwee  New encounter 11.10pm  \"patient not responsive with concerns of agonal breathing. Stat ABG was done   with pH of 7.026 pCO2 of 38 bicarb of 9.9 base deficit of 21, lactic acid of   12.64.  Low blood pressure with maps in the 40s.  Hemoglobin of 9.1 hematocrit   of 27.0. Patient was given 4 amp of bicarb ,1 amp of calcium gluconate and   continued on a bicarb drip at 150 cc/h.  Due to his hypotension Levophed was   initiated. Noticed improvement in his mental status after bicarb pushes.   Noticed improvement in his mental status able to communicate.    Treatment: Labs, IVF, phenylephrine gtt,  Levophed, patient monitoring  Options provided:  -- Metabolic encephalopathy  -- Other - I will add my own diagnosis  -- Disagree - Not applicable / Not valid  -- Disagree - Clinically unable to determine / Unknown  -- Refer to Clinical Documentation Reviewer    PROVIDER RESPONSE TEXT:    This patient has metabolic encephalopathy.    Query created by: Evelyn Luis on 4/5/2024 2:23 PM      Electronically signed by:  Sagar PICHARDO MD 4/5/2024 2:33 PM

## (undated) DEVICE — GAUZE,SPONGE,4"X4",8PLY,STRL,LF,10/TRAY: Brand: MEDLINE

## (undated) DEVICE — SOLUTION IRRIG 1000ML 0.9% SOD CHL USP POUR PLAS BTL

## (undated) DEVICE — SET VLV 3 PC AWS DISPOSABLE GRDIAN SCOPEVALET

## (undated) DEVICE — SUTURE MCRYL + SZ 3-0 L27IN ABSRB UD PS1 L24MM 3/8 CIR REV MCP936H

## (undated) DEVICE — GLOVE SURG SZ 7.5 L11.73IN FNGR THK9.8MIL STRW LTX POLYMER

## (undated) DEVICE — ELECTRODE PT RET AD L9FT HI MOIST COND ADH HYDRGEL CORDED

## (undated) DEVICE — APPLICATOR MEDICATED 26 CC SOLUTION HI LT ORNG CHLORAPREP

## (undated) DEVICE — COVER LT HNDL BLU PLAS

## (undated) DEVICE — MOUTHPIECE ENDOSCP L CTRL OPN AND SIDE PORTS DISP

## (undated) DEVICE — DRAPE,U/ SHT,SPLIT,PLAS,STERIL: Brand: MEDLINE

## (undated) DEVICE — Device: Brand: DISPOSABLE ELECTROSURGICAL SNARE

## (undated) DEVICE — 3M™ IOBAN™ 2 ANTIMICROBIAL INCISE DRAPE 6650EZ: Brand: IOBAN™ 2

## (undated) DEVICE — MERCY FAIRFIELD TURNOVER KIT: Brand: MEDLINE INDUSTRIES, INC.

## (undated) DEVICE — DRESSING W4XL8IN ISLANDTHERABOND 3D

## (undated) DEVICE — LOOP,VESSEL,MAXI,BLUE,2/PK,STERILE: Brand: MEDLINE

## (undated) DEVICE — GAUZE,SPONGE,4"X4",16PLY,XRAY,STRL,LF: Brand: MEDLINE

## (undated) DEVICE — DECANTER FLD 9IN ST BG FOR ASEP TRNSF OF FLD

## (undated) DEVICE — SUTURE VICRYL + SZ 3-0 L36IN ABSRB UD L36MM CT-1 1/2 CIR VCP944H

## (undated) DEVICE — HANDPIECE SET WITH HIGH FLOW TIP AND SUCTION TUBE: Brand: INTERPULSE

## (undated) DEVICE — SUTURE PERMAHAND SZ 2-0 L12X18IN NONABSORBABLE BLK SILK A185H

## (undated) DEVICE — SHEET, T, LAPAROTOMY, STERILE: Brand: MEDLINE

## (undated) DEVICE — 3M™ STERI-STRIP™ REINFORCED ADHESIVE SKIN CLOSURES, R1547, 1/2 IN X 4 IN (12 MM X 100 MM), 6 STRIPS/ENVELOPE: Brand: 3M™ STERI-STRIP™

## (undated) DEVICE — YANKAUER,OPEN TIP,W/O VENT,STERILE: Brand: MEDLINE INDUSTRIES, INC.

## (undated) DEVICE — SUTURE BOOT: Brand: DEROYAL

## (undated) DEVICE — TOTAL BASIC PK

## (undated) DEVICE — GOWN SIRUS NONREIN XL W/TWL: Brand: MEDLINE INDUSTRIES, INC.

## (undated) DEVICE — ADHESIVE SKIN CLOSURE WND 8.661X1.5 IN 22 CM LIQUIBAND SECUR

## (undated) DEVICE — 3F 80 CM NOVASIL SILICONE SINGLE LUMEN EMBOLECTOMY CATHETER: Brand: LEMAITRE EMBOLECTOMY CATHETER

## (undated) DEVICE — PAD,ABDOMINAL,8"X10",ST,LF: Brand: MEDLINE

## (undated) DEVICE — COVER,MAYO STAND,XL,STERILE: Brand: MEDLINE

## (undated) DEVICE — PILLOW ABD SM W12XH6XL18IN LEG FOAM NYLEX CVR W/ STRP DISP

## (undated) DEVICE — WORKING LENGTH 155CM, WORKING CHANNEL 2.8MM: Brand: RESOLUTION 360 CLIP

## (undated) DEVICE — SCANLAN® VASCU-STATT® SINGLE-USE BULLDOG CLAMP - MIDI ANGLED 45° (WHITE), CLAMPING PRESSURE 25-30 G (2/STERILE PKG): Brand: SCANLAN® VASCU-STATT® SINGLE-USE BULLDOG CLAMP

## (undated) DEVICE — DRESSING WND 4X12 IN ANTIMICROBIAL PROTCT THERABOND 3D

## (undated) DEVICE — BLANKET WRM W29.9XL79.1IN UP BODY FORC AIR MISTRAL-AIR

## (undated) DEVICE — TOWEL,OR,DSP,ST,WHITE,DLX,XR,4/PK,20PK/C: Brand: MEDLINE

## (undated) DEVICE — INTENDED TO BE USED TO OCCLUDE, RETRACT AND IDENTIFY ARTERIES, VEINS, TENDONS AND NERVES IN SURGICAL PROCEDURES: Brand: STERION®  VESSEL LOOP

## (undated) DEVICE — 4F 80 CM NOVASIL SILICONE SINGLE LUMEN EMBOLECTOMY CATHETER, EIFU: Brand: LEMAITRE EMBOLECTOMY CATHETER

## (undated) DEVICE — PRUITT IRRIGATION OCCLUSION CATHETER, 9F, EIFU: Brand: PRUITT IRRIGATION OCCLUSION CATHETER

## (undated) DEVICE — TYVEK SELF SEAL POUCH WITH STERRAD CHEMICAL INDICATOR 200 MM X 400 MM: Brand: TYVEK SELF SEAL POUCH

## (undated) DEVICE — GLOVE SURG SZ 85 L12IN THK104MIL CRM LTX SMOOTH PWD ST

## (undated) DEVICE — SUTURE VCRL + SZ 1 L36IN ABSRB UD L36MM CT-1 1/2 CIR VCP947H

## (undated) DEVICE — SHEET,DRAPE,53X77,STERILE: Brand: MEDLINE

## (undated) DEVICE — SOLUTION IV IRRIG WATER 500ML POUR BRL ST 2F7113

## (undated) DEVICE — 450 ML BOTTLE OF 0.05% CHLORHEXIDINE GLUCONATE IN 99.95% STERILE WATER FOR IRRIGATION, USP AND APPLICATOR.: Brand: IRRISEPT ANTIMICROBIAL WOUND LAVAGE

## (undated) DEVICE — BW-412T DISP COMBO CLEANING BRUSH: Brand: SINGLE USE COMBINATION CLEANING BRUSH

## (undated) DEVICE — PROCEDURE KIT ENDOSCP CUST

## (undated) DEVICE — PACK SURG HIP ASSESSORY

## (undated) DEVICE — 2108 SERIES SAGITTAL BLADE (24.8 X 0.88 X 73.8MM)

## (undated) DEVICE — GOWN AURORA NONREINF LG: Brand: MEDLINE INDUSTRIES, INC.

## (undated) DEVICE — SUTURE VICRYL + SZ 3-0 L27IN ABSRB UD L26MM SH 1/2 CIR VCP416H

## (undated) DEVICE — 5F 80 CM NOVASIL SILICONE SINGLE LUMEN EMBOLECTOMY CATHETER, EIFU: Brand: LEMAITRE EMBOLECTOMY CATHETER

## (undated) DEVICE — SUTURE VCRL + SZ 2-0 L18IN ABSRB UD CT1 L36MM 1/2 CIR VCP839D

## (undated) DEVICE — PRUITT OCCLUSION CATHETER, 3F, EIFU: Brand: PRUITT OCCLUSION CATHETER

## (undated) DEVICE — GLOVE SURG SZ 85 L12IN FNGR THK79MIL GRN LTX FREE

## (undated) DEVICE — INTENDED FOR TISSUE SEPARATION, AND OTHER PROCEDURES THAT REQUIRE A SHARP SURGICAL BLADE TO PUNCTURE OR CUT.: Brand: BARD-PARKER ® STAINLESS STEEL BLADES

## (undated) DEVICE — VI-DRAPE ISOLATION BAG: Brand: CONVERTORS

## (undated) DEVICE — STAPLER SKIN H3.9MM WIRE DIA0.58MM CRWN 6.9MM 35 STPL ROT

## (undated) DEVICE — 3 ML SYRINGE LUER-LOCK TIP: Brand: MONOJECT

## (undated) DEVICE — SPONGE LAP W18XL18IN WHT COT 4 PLY FLD STRUNG RADPQ DISP ST 2 PER PACK

## (undated) DEVICE — SYRINGE MED 30ML STD CLR PLAS LUERLOCK TIP N CTRL DISP

## (undated) DEVICE — Device: Brand: POWER-FLO®

## (undated) DEVICE — MONOJECT MAGELLAN 1 ML TUBERCULIN SAFETY SYRINGE PERMANENT NEEDLE 27G X1/2 IN. (0.4 X 13 MM): Brand: MONOJECT

## (undated) DEVICE — MAJOR SET UP PK